# Patient Record
Sex: MALE | Race: WHITE | NOT HISPANIC OR LATINO | URBAN - METROPOLITAN AREA
[De-identification: names, ages, dates, MRNs, and addresses within clinical notes are randomized per-mention and may not be internally consistent; named-entity substitution may affect disease eponyms.]

---

## 2018-12-02 ENCOUNTER — EMERGENCY (EMERGENCY)
Facility: HOSPITAL | Age: 68
LOS: 1 days | Discharge: HOME | End: 2018-12-02
Attending: EMERGENCY MEDICINE | Admitting: EMERGENCY MEDICINE
Payer: MEDICARE

## 2018-12-02 VITALS
TEMPERATURE: 98 F | DIASTOLIC BLOOD PRESSURE: 89 MMHG | HEART RATE: 125 BPM | OXYGEN SATURATION: 100 % | SYSTOLIC BLOOD PRESSURE: 127 MMHG | RESPIRATION RATE: 20 BRPM

## 2018-12-02 VITALS — TEMPERATURE: 98 F

## 2018-12-02 DIAGNOSIS — F43.23 ADJUSTMENT DISORDER WITH MIXED ANXIETY AND DEPRESSED MOOD: ICD-10-CM

## 2018-12-02 LAB
ALBUMIN SERPL ELPH-MCNC: 4.8 G/DL — SIGNIFICANT CHANGE UP (ref 3.3–5)
ALP SERPL-CCNC: 54 U/L — SIGNIFICANT CHANGE UP (ref 40–120)
ALT FLD-CCNC: 22 U/L — SIGNIFICANT CHANGE UP (ref 4–41)
APAP SERPL-MCNC: < 15 UG/ML — LOW (ref 15–25)
AST SERPL-CCNC: 20 U/L — SIGNIFICANT CHANGE UP (ref 4–40)
BASOPHILS # BLD AUTO: 0.03 K/UL — SIGNIFICANT CHANGE UP (ref 0–0.2)
BASOPHILS NFR BLD AUTO: 0.3 % — SIGNIFICANT CHANGE UP (ref 0–2)
BILIRUB SERPL-MCNC: 2.4 MG/DL — HIGH (ref 0.2–1.2)
BUN SERPL-MCNC: 31 MG/DL — HIGH (ref 7–23)
CALCIUM SERPL-MCNC: 10 MG/DL — SIGNIFICANT CHANGE UP (ref 8.4–10.5)
CHLORIDE SERPL-SCNC: 101 MMOL/L — SIGNIFICANT CHANGE UP (ref 98–107)
CO2 SERPL-SCNC: 23 MMOL/L — SIGNIFICANT CHANGE UP (ref 22–31)
CREAT SERPL-MCNC: 1.14 MG/DL — SIGNIFICANT CHANGE UP (ref 0.5–1.3)
EOSINOPHIL # BLD AUTO: 0.04 K/UL — SIGNIFICANT CHANGE UP (ref 0–0.5)
EOSINOPHIL NFR BLD AUTO: 0.3 % — SIGNIFICANT CHANGE UP (ref 0–6)
ETHANOL BLD-MCNC: < 10 MG/DL — SIGNIFICANT CHANGE UP
GLUCOSE SERPL-MCNC: 103 MG/DL — HIGH (ref 70–99)
HCT VFR BLD CALC: 43.6 % — SIGNIFICANT CHANGE UP (ref 39–50)
HGB BLD-MCNC: 15.5 G/DL — SIGNIFICANT CHANGE UP (ref 13–17)
IMM GRANULOCYTES # BLD AUTO: 0.04 # — SIGNIFICANT CHANGE UP
IMM GRANULOCYTES NFR BLD AUTO: 0.3 % — SIGNIFICANT CHANGE UP (ref 0–1.5)
LYMPHOCYTES # BLD AUTO: 0.94 K/UL — LOW (ref 1–3.3)
LYMPHOCYTES # BLD AUTO: 7.8 % — LOW (ref 13–44)
MCHC RBC-ENTMCNC: 31.1 PG — SIGNIFICANT CHANGE UP (ref 27–34)
MCHC RBC-ENTMCNC: 35.6 % — SIGNIFICANT CHANGE UP (ref 32–36)
MCV RBC AUTO: 87.4 FL — SIGNIFICANT CHANGE UP (ref 80–100)
MONOCYTES # BLD AUTO: 0.95 K/UL — HIGH (ref 0–0.9)
MONOCYTES NFR BLD AUTO: 7.9 % — SIGNIFICANT CHANGE UP (ref 2–14)
NEUTROPHILS # BLD AUTO: 9.98 K/UL — HIGH (ref 1.8–7.4)
NEUTROPHILS NFR BLD AUTO: 83.4 % — HIGH (ref 43–77)
NRBC # FLD: 0 — SIGNIFICANT CHANGE UP
PLATELET # BLD AUTO: 213 K/UL — SIGNIFICANT CHANGE UP (ref 150–400)
PMV BLD: 9.5 FL — SIGNIFICANT CHANGE UP (ref 7–13)
POTASSIUM SERPL-MCNC: 4 MMOL/L — SIGNIFICANT CHANGE UP (ref 3.5–5.3)
POTASSIUM SERPL-SCNC: 4 MMOL/L — SIGNIFICANT CHANGE UP (ref 3.5–5.3)
PROT SERPL-MCNC: 8 G/DL — SIGNIFICANT CHANGE UP (ref 6–8.3)
RBC # BLD: 4.99 M/UL — SIGNIFICANT CHANGE UP (ref 4.2–5.8)
RBC # FLD: 12.4 % — SIGNIFICANT CHANGE UP (ref 10.3–14.5)
SALICYLATES SERPL-MCNC: < 5 MG/DL — LOW (ref 15–30)
SODIUM SERPL-SCNC: 140 MMOL/L — SIGNIFICANT CHANGE UP (ref 135–145)
TSH SERPL-MCNC: 1.15 UIU/ML — SIGNIFICANT CHANGE UP (ref 0.27–4.2)
WBC # BLD: 11.98 K/UL — HIGH (ref 3.8–10.5)
WBC # FLD AUTO: 11.98 K/UL — HIGH (ref 3.8–10.5)

## 2018-12-02 PROCEDURE — 90792 PSYCH DIAG EVAL W/MED SRVCS: CPT

## 2018-12-02 PROCEDURE — 76705 ECHO EXAM OF ABDOMEN: CPT | Mod: 26

## 2018-12-02 PROCEDURE — 99284 EMERGENCY DEPT VISIT MOD MDM: CPT | Mod: GC,25

## 2018-12-02 RX ORDER — SODIUM CHLORIDE 9 MG/ML
1000 INJECTION INTRAMUSCULAR; INTRAVENOUS; SUBCUTANEOUS ONCE
Qty: 0 | Refills: 0 | Status: COMPLETED | OUTPATIENT
Start: 2018-12-02 | End: 2018-12-02

## 2018-12-02 RX ADMIN — SODIUM CHLORIDE 2000 MILLILITER(S): 9 INJECTION INTRAMUSCULAR; INTRAVENOUS; SUBCUTANEOUS at 09:09

## 2018-12-02 NOTE — ED PROVIDER NOTE - NS ED ROS FT
GENERAL: No fever or chills, EYES: no change in vision, HEENT: no trouble swallowing or speaking, CARDIAC: no chest pain, PULMONARY: no cough or SOB, GI: no abdominal pain, no nausea, no vomiting, no diarrhea or constipation, : No changes in urination, SKIN: no rashes, NEURO: no headache,  MSK: No joint pain ~Maria Luisa De Jesus M.D. Resident

## 2018-12-02 NOTE — ED ADULT TRIAGE NOTE - CHIEF COMPLAINT QUOTE
Pt arrives c/o "not feeling well, my medication is making my mind all over the place" - presents Vibryd medication.  Prescribed by PCP for Depression.  Pt reports not taking care of himself, not eating well, not sleeping well, appearing unkempt.  Pt denies SI/HI, feels might have visual hallucinations, fixated around his brother's death 4 months ago.  Pt HR noted to be 120 in triage, denies cp/palpitation.  EKG obtained.  Awaits FIT eval.

## 2018-12-02 NOTE — ED BEHAVIORAL HEALTH ASSESSMENT NOTE - HPI (INCLUDE ILLNESS QUALITY, SEVERITY, DURATION, TIMING, CONTEXT, MODIFYING FACTORS, ASSOCIATED SIGNS AND SYMPTOMS)
69 yo CM w/ reported h/o depression which is being treated by his PCP w/ Vibryd 10mg daily. Pt states that he has been depressed since the recent passing of his brother a few month ago. Pt states that he was started Vibryd by his PCP and it been making feel like he is all over the place and not been able to concentrate well. Pt states that some time he has miss perception of objects since starting the medication. Pt states they are not psychosis like the ED doc thought, but more like Illusion. Pt states that he is very anxious they put him in the psych section. Pt states, " I don't belong here and I just wanted to know if I could stop my Vibryd? " Pt report being very upset about the ER doctor put him here in psych. Pt reports, " all said was I still grieving my brother death and he reports that he is entitle to feel like that without being locked up."   Pt denies any suicidal ideation, suicide attempt, homicidal ideation, auditory/visual hallucinations, CAH, or louise.     Pt states that he does have past alcohol issue but denies any current alcohol use or substance use.   Psychiatric ROS:  Depression: States + depressed mood, states + some change in appetite, states + decreased energy, states+  problems with concentration, states no change in interests.  Louise: States no hyperreligious, states no grandiosity, states no decreased need for sleep, states no flight of ideas, states no distractibility, states no sustained irritability or euphoria, states no hyper-talkativeness  Anxiety: States no panic attacks.     Psychotic:States no auditory and visual hallucinations.  States no thought insertion/withdrawal/broadcasting.  States no ideas of reference.  SI: States no suicidal ideation , no intent, no plan  HI: States no homicidal ideation 69 yo CM w/ reported h/o depression which is being treated by his PCP w/ Vibryd 10mg daily. Pt states that he has been depressed since the recent passing of his brother a few month ago. Pt states that he was started Vibryd by his PCP and it been making feel like he is all over the place and not been able to concentrate well. Pt states that some time he has miss perception of objects since starting the medication. Pt states they are not psychosis like the ED doc thought, but more like Illusion. Pt states that he is very anxious they put him in the psych section. Pt states, " I don't belong here and I just wanted to know if I could stop my Vibryd? " Pt report being very upset about the ER doctor put him here in psych. Pt reports, " all said was I still grieving my brother death and he reports that he is entitle to feel like that without being locked up."   Pt denies any suicidal ideation, suicide attempt, homicidal ideation, auditory/visual hallucinations, CAH, or caryl.   Per SW collateral info: Worker met with patient at bedside alongside NP Ashleigh Amaya to discuss potential discharge plans. Patient denies SI/HI but states that his brother passed away four months ago and he is still grieving his death because now he is the last surviving member of his immediate family. Patient states he feels safe at home and rents a room in [place]. Patient states he is not completely satisfied with living in the room and he is also looking for other options for housing. Worker discussed with the patient his access to food and his ADLs. Patient states he receives SSI and retired in June 2018. He states that he was supposed to spend time with his brother after skilled nursing but he passed away with cancer.  Patient states that he goes to the supermarket and he is able to shop on his own. He also states that he goes to his [employer] in the community for food drives and has insight into different resources in the community. Patient states he is able to shower on his own. Patient also states that he went to a friend’s home for thanksgiving and he did eat. Worker discussed follow up to the [facility] crisis center and encouraged patient to walk in from the hours of 9-7pm Monday to Friday. Patient states he used to go to [facility] on [address] but is reconsidering other options for care. Worker provided support around the patient’s loss.

## 2018-12-02 NOTE — ED PROVIDER NOTE - PROGRESS NOTE DETAILS
lisa: seen by psychiatry. pt to f/u crisis center tomorrow. pt alert, aware and willing to follow-up.  Heena elevated; stella torres pt to f/u.  spoke with pt and offered resources for opt food options.

## 2018-12-02 NOTE — ED BEHAVIORAL HEALTH ASSESSMENT NOTE - RISK ASSESSMENT
Low imminent risk given that he denies any active  suicidal ideation, h/o suicide attempt, homicidal ideation, auditory/visual hallucinations, CAH, or caryl. He is however chronic moderate 2/2 poor psychosocial support and access to appropriate medical care,

## 2018-12-02 NOTE — ED PROVIDER NOTE - MEDICAL DECISION MAKING DETAILS
67 yo M PMHx depression, HTN, HLD p/w depressed mood and inability to concentrate, tachycardic on exam, will check labs, EKG, tox screen, IVF, psych evaluation

## 2018-12-02 NOTE — ED ADULT NURSE NOTE - OBJECTIVE STATEMENT
pt received to room 17 pt is A&0x4 pt comes to ED for increased depression since his brother passed away. pt denies SI/HI drugs or ETOH at this time. pt has hx of OCD and ZHH in the past. pt HR was elevated in triage. pt HR is normal at this time. pt VSS 18 g placed in R AC labs were drawn and sent EDMD at bedside for eval awaiting further orders Will continue to monitor the pt

## 2018-12-02 NOTE — ED PROVIDER NOTE - PHYSICAL EXAMINATION
Gen: AAOx3, non-toxic  Head: NCAT  HEENT: EOMI, oral mucosa moist, normal conjunctiva  Lung: CTAB, no respiratory distress, no wheezes/rhonchi/rales B/L, speaking in full sentences  CV: tachycardic, no murmurs, rubs or gallops  Abd: soft, NTND, no guarding  MSK: no visible deformities  Neuro: No focal sensory or motor deficits  Skin: Warm, well perfused, no rash  Psych: normal affect.   ~Maria Luisa De Jesus M.D. Resident

## 2018-12-02 NOTE — ED ADULT NURSE NOTE - BP NONINVASIVE DIASTOLIC (MM HG)
I'm sorry. My father is quite sick and I am not adding patients. Perhaps she can see Dr. Ana Paula Reed if it cannot wait. 80

## 2018-12-02 NOTE — ED PROVIDER NOTE - OBJECTIVE STATEMENT
69 yo M PMHx depression, HTN, HLD p/w depressed mood and inability to concentrate. Pt has been taking Viibryd x 1 month for depression with minimal relief. His brother passed away about 4 months ago and he has been having a difficult time coping since then. Pt states he has no interest in activities, he doesn't want to go to work, and he doesn't want to shower or eat. He has not been sleeping well and states he is constantly thinking about a wood lamp at work that reminds him of the wood that was used in his brother's burial. He has thought of ending his life before and thought of different ways to do it but denies active suicidal or homicidal ideation. He denies fevers/chills, headaches, vision changes, CP/palpitations, SOB, cough, abd pain, N/V, dysuria, constipation/diarrhea. Denies tobacco, ETOH, drug use. 67 yo M PMHx depression, HTN, HLD p/w depressed mood and inability to concentrate. Pt has been taking Viibryd x 1 month for depression with minimal relief. His brother passed away about 4 months ago and he has been having a difficult time coping since then. Pt states he has no interest in activities, he doesn't want to go to work, and he doesn't want to shower or eat. He has not been sleeping well and states he is constantly thinking about a wood lamp at work that reminds him of the wood that was used in his brother's burial. He has thought of ending his life before and thought of different ways to do it but denies active suicidal or homicidal ideation. Pt tries to go to the [facility] center and has been getting his food from there. He denies fevers/chills, headaches, vision changes, CP/palpitations, SOB, cough, abd pain, N/V, dysuria, constipation/diarrhea. Denies tobacco, ETOH, drug use. 67 yo M PMHx depression, OCD, HTN, HLD p/w depressed mood and inability to concentrate. Pt has been taking Viibryd x 1 month for depression with minimal relief. His brother passed away about 4 months ago and he has been having a difficult time coping since then. Pt states he has no interest in activities, he doesn't want to go to work, and he doesn't want to shower or eat. He has not been sleeping well and states he is constantly thinking about a wood lamp at work that reminds him of the wood that was used in his brother's burial. He has thought of ending his life before and thought of different ways to do it but denies active suicidal or homicidal ideation. Pt tries to go to the [facility] center and has been getting his food from there. He denies fevers/chills, headaches, vision changes, CP/palpitations, SOB, cough, abd pain, N/V, dysuria, constipation/diarrhea. Denies tobacco, ETOH, drug use.

## 2018-12-02 NOTE — ED ADULT NURSE NOTE - NSIMPLEMENTINTERV_GEN_ALL_ED
Implemented All Universal Safety Interventions:  [place] to call system. Call bell, personal items and telephone within reach. Instruct patient to call for assistance. Room bathroom lighting operational. Non-slip footwear when patient is off stretcher. Physically safe environment: no spills, clutter or unnecessary equipment. Stretcher in lowest position, wheels locked, appropriate side rails in place.

## 2018-12-02 NOTE — ED PROVIDER NOTE - ATTENDING CONTRIBUTION TO CARE
lisa: pt c/o feeling depressed for months? Pt states his father  one year ago and brother  4-5 months ago. Pt has repetitive thoughts. Given an antidepressant by PCP one month ago w/o relief. Eating less, only when he goes to [employer]. lost ? 18 pounds. Drinks several glasses fluids a day. States his blood tests were normal one month ago.    pmh unremrakable.  BH: states was hospitalized several times in the past at [facility] for OCD; stopped meds some time ago (not sure when?).  SH: drank several beers a day for 30 plus years. denies recent etoh  exam: hr 103. pt appears depressed. states he has multiple thoughts of concern, including events that occurred at . Denies wanting to hurt self or others.  impression: depression with elements of OCD. tachycardia, etio??.  recc: cardiac monitor. Labs, reassess> psych consult once cleared medically.

## 2018-12-02 NOTE — ED BEHAVIORAL HEALTH ASSESSMENT NOTE - SAFETY PLAN DETAILS
Pt agree to call 911 and go to the nearest ER if sx worsen or if feels like a danger to self and others.

## 2018-12-02 NOTE — ED BEHAVIORAL HEALTH NOTE - BEHAVIORAL HEALTH NOTE
Worker met with patient at bedside alongside NP Ashleigh Amaya to discuss potential discharge plans. Patient denies SI/HI but states that his brother passed away four months ago and he is still grieving his death because now he is the last surviving member of his immediate family. Patient states he feels safe at home and rents a room in [place]. Patient states he is not completely satisfied with living in the room and he is also looking for other options for housing. Worker discussed with the patient his access to food and his ADLs. Patient states he receives SSI and retired in June 2018. He states that he was supposed to spend time with his brother after senior care but he passed away with cancer.  Patient states that he goes to the supermarket and he is able to shop on his own. He also states that he goes to his [employer] in the community for food drives and has insight into different resources in the community. Patient states he is able to shower on his own. Patient also states that he went to a friend’s home for thanksgiving and he did eat. Worker discussed follow up to the [facility] crisis center and encouraged patient to walk in from the hours of 9-7pm Monday to Friday. Patient states he used to go to [facility] on [address] but is reconsidering other options for care. Worker provided support around the patient’s loss.

## 2018-12-02 NOTE — ED BEHAVIORAL HEALTH NOTE - BEHAVIORAL HEALTH NOTE
Patent briefly seen in  ED as per request of attending. Patient reports that he is not user why he was asked to be seen psychiatrically. He denies active suicidal ideation, denies homicidal ideation, noted past pasychitrica history of inpatient hosputalzation at [facility] about 20 years due to stressors involving the relocation of his parents to [place]. There were concerns by medical ED attending who expresesd that landen has not been able to care for self but patient hansa this staing that he has been able to attend to ADLs and has access to food. Patient denies any current substance including denial of cannabis, street benzodiazepine use, LCD, PCP, cocaine, alcohol use or any other druig use but reports prior hisoory of alchol use. Patent briefly seen in  ED as per request of attending. Patient reports that he is not user why he was asked to be seen psychiatrically. He denies active suicidal ideation, denies homicidal ideation, noted past psychiatric history of inpatient hospitalization at [facility] about 20 years due to stressors involving the relocation of his parents to [place]. There were concerns by medical ED attending who expressed that patient has not been able to care for self but patient denies this stating that he has been able to attend to ADLs and has access to food. Patient denies any current substance including denial of cannabis, street benzodiazepine use, LCD, PCP, cocaine, alcohol use or any other drug use but reports prior history of alcohol use. He reports that his main concern is that housing as he is renting a one bedroom in which he noted as too small for him and he would like to receive assistance with housing he noted retiring from working as a  June 2018 and reports he experienced the loss of his brother in July 2018 who passed away due to complication of cancer. He continues to state he has been having trouble coping with his loss as he noted being the last surviving member of his immediate family. He denies any specific symptoms of depression but there is concern that patient is minimizing his depressed mood vs concern for low mood due to bereavement. He is in agreement to seek care at the crisis clinic as he is concerned with Viibryd 10mg prescribed by PCP as he no longer wishes to continue taking this medication. Patent briefly seen in  ED as per request of attending. Patient reports that he is not user why he was asked to be seen psychiatrically. He denies active suicidal ideation, denies homicidal ideation, noted past psychiatric history of inpatient hospitalization at [facility] about 20 years ago due to stressors involving the relocation of his parents to [place] at that time. There were concerns by medical ED attending who expressed that patient has not been able to care for self but patient denies this stating that he has been able to attend to ADLs and has access to food. Patient denies any current substance including denial of cannabis, street benzodiazepine use, LCD, PCP, cocaine, alcohol use or any other drug use but reports prior history of alcohol use. He reports that his main concern is that housing as he is renting a one bedroom in which he noted as too small for him and he would like to receive assistance with housing he noted retiring from working as a  June 2018 and reports he experienced the loss of his brother in July 2018 who passed away due to complication of cancer. He continues to state he has been having trouble coping with his loss as he noted being the last surviving member of his immediate family. He denies any specific symptoms of depression but there is concern that patient is minimizing his depressed mood vs concern for low mood due to bereavement. He is in agreement to seek care at the crisis clinic as he is concerned with Viibryd 10mg prescribed by PCP as he no longer wishes to continue taking this medication. Patent briefly seen in  ED as per request of attending. Patient reports that he is not user why he was asked to be seen psychiatrically. He denies active suicidal ideation, denies homicidal ideation, noted past psychiatric history of inpatient hospitalization at [facility] about 20 years ago due to stressors involving the relocation of his parents to [place] at that time. There were concerns by medical ED attending who expressed that patient has not been able to care for self but patient denies this stating that he has been able to attend to ADLs and has access to food. Patient denies any current substance including denial of cannabis, street benzodiazepine use, LCD, PCP, cocaine, alcohol use or any other drug use but reports prior history of alcohol use (last alcohol use Jan 2018 of 2-3 beers) with denies of past rehab visits, seizures, complicated withdrawal symptoms, black out or DTs. He reports that his main concern is that housing as he is renting a one bedroom in which he noted as too small for him and he would like to receive assistance with housing he noted retiring from working as a  June 2018 and reports he experienced the loss of his brother in July 2018 who passed away due to complication of cancer. He continues to state he has been having trouble coping with his loss as he noted being the last surviving member of his immediate family. He denies any specific symptoms of depression but there is concern that patient is minimizing his depressed mood vs concern for low mood due to bereavement. He is in agreement to seek care at the crisis clinic as he is concerned with Viibryd 10mg prescribed by PCP as he no longer wishes to continue taking this medication. Patent briefly seen in  ED as per request of attending. Patient reports that he is not user why he was asked to be seen psychiatrically. He denies active suicidal ideation, denies homicidal ideation, noted past psychiatric history of inpatient hospitalization at [facility] about 20 years ago due to stressors involving the relocation of his parents to [place] at that time. There were concerns by medical ED attending who expressed that patient has not been able to care for self but patient denies this stating that he has been able to attend to ADLs and has access to food. Patient denies any current substance including denial of cannabis, street benzodiazepine use, LCD, PCP, cocaine, alcohol use or any other drug use but reports prior history of alcohol use (last alcohol use Jan 2018 of 2-3 beers). He denies of past rehab visits, seizures, complicated withdrawal symptoms, black out or DTs related to alcohol. He reports that his main concern is that housing as he is renting a one bedroom in which he noted as too small for him and he would like to receive assistance with housing he noted retiring from working as a  June 2018 and reports he experienced the loss of his brother in July 2018 who passed away due to complication of cancer. He continues to state he has been having trouble coping with his loss as he noted being the last surviving member of his immediate family. He denies any specific symptoms of depression but there is concern that patient is minimizing his depressed mood vs concern for low mood due to bereavement. He is in agreement to seek care at the crisis clinic as he is concerned with Viibryd 10mg prescribed by PCP as he no longer wishes to continue taking this medication.

## 2018-12-02 NOTE — ED BEHAVIORAL HEALTH ASSESSMENT NOTE - DESCRIPTION
Calm and cooperative   Vital Signs Last 24 Hrs  T(C): 36.4 (02 Dec 2018 09:25), Max: 36.7 (02 Dec 2018 09:09)  T(F): 97.5 (02 Dec 2018 09:25), Max: 98 (02 Dec 2018 09:09)  HR: 97 (02 Dec 2018 09:16) (97 - 125)  BP: 148/74 (02 Dec 2018 09:16) (127/89 - 148/74)  BP(mean): --  RR: 14 (02 Dec 2018 09:16) (14 - 20)  SpO2: 100% (02 Dec 2018 09:16) (100% - 100%) See ED provider note. Pt w/ increase white count. Pt was med cleared See HPI and BH notes

## 2018-12-02 NOTE — ED BEHAVIORAL HEALTH ASSESSMENT NOTE - SUMMARY
67 yo CM w/ reported h/o depression which is being treated by his PCP w/ Vibryd 10mg daily. Pt states that he has been depressed since the recent passing of his brother a few month ago. Pt states that he was started Vibryd by his PCP and it been making feel like he is all over the place and not been able to concentrate well. Pt states that some time he has miss perception of objects since starting the medication. Pt states they are not psychosis like the ED doc thought, but more like Illusion. Pt states that he is very anxious they put him in the psych section. Pt states, " I don't belong here and I just wanted to know if I could stop my Vibryd? " Pt report being very upset about the ER doctor put him here in psych. Pt reports, " all said was I still grieving my brother death and he reports that he is entitle to feel like that without being locked up." Pt denies any active suicidal ideation, h/o suicide attempt, homicidal ideation, auditory/visual hallucinations, CAH, or caryl.   Pt educated that he is on the initial dose of Vibryd and he can d/c if it causing serious side effects but he should inform his PCP before doing so. Pt also educated that a medication like Vibryd should be manage by a psychiatrist instead a PCP because the medication has risk of worsening sx and it not just a simple SSRI which most PCP can prescribe. Pt offered [facility] which he accept and agrees to f/u tomorrow with. Pt also had some housing issues which were address by SW.  Pt offered [facility] psych admission which he decline and current doesn't meet the criteria for [facility] admission.

## 2018-12-03 ENCOUNTER — OUTPATIENT (OUTPATIENT)
Dept: OUTPATIENT SERVICES | Facility: HOSPITAL | Age: 68
LOS: 1 days | Discharge: STILL ADMITTED OR ADMINISTRATIVE | End: 2018-12-03

## 2018-12-03 NOTE — PROVIDER CONTACT NOTE (OTHER) - ASSESSMENT
jarrell received call from patient stating that his address is [address] [place] [address].  If patient is not at that residence he can be found at [address] [place] [address]

## 2018-12-04 ENCOUNTER — EMERGENCY (EMERGENCY)
Facility: HOSPITAL | Age: 68
LOS: 1 days | Discharge: HOME | End: 2018-12-04
Attending: EMERGENCY MEDICINE | Admitting: EMERGENCY MEDICINE
Payer: MEDICARE

## 2018-12-04 VITALS
HEART RATE: 97 BPM | SYSTOLIC BLOOD PRESSURE: 152 MMHG | RESPIRATION RATE: 18 BRPM | OXYGEN SATURATION: 99 % | TEMPERATURE: 98 F | DIASTOLIC BLOOD PRESSURE: 92 MMHG

## 2018-12-04 VITALS
SYSTOLIC BLOOD PRESSURE: 153 MMHG | RESPIRATION RATE: 16 BRPM | HEART RATE: 84 BPM | DIASTOLIC BLOOD PRESSURE: 87 MMHG | TEMPERATURE: 98 F | OXYGEN SATURATION: 100 %

## 2018-12-04 DIAGNOSIS — N40.0 BENIGN PROSTATIC HYPERPLASIA WITHOUT LOWER URINARY TRACT SYMPTOMS: ICD-10-CM

## 2018-12-04 DIAGNOSIS — E78.5 HYPERLIPIDEMIA, UNSPECIFIED: ICD-10-CM

## 2018-12-04 DIAGNOSIS — F33.9 MAJOR DEPRESSIVE DISORDER, RECURRENT, UNSPECIFIED: ICD-10-CM

## 2018-12-04 DIAGNOSIS — F41.9 ANXIETY DISORDER, UNSPECIFIED: ICD-10-CM

## 2018-12-04 DIAGNOSIS — I10 ESSENTIAL (PRIMARY) HYPERTENSION: ICD-10-CM

## 2018-12-04 LAB
ALBUMIN SERPL ELPH-MCNC: 4.5 G/DL — SIGNIFICANT CHANGE UP (ref 3.3–5)
ALP SERPL-CCNC: 56 U/L — SIGNIFICANT CHANGE UP (ref 40–120)
ALT FLD-CCNC: 19 U/L — SIGNIFICANT CHANGE UP (ref 4–41)
APAP SERPL-MCNC: < 15 UG/ML — LOW (ref 15–25)
AST SERPL-CCNC: 25 U/L — SIGNIFICANT CHANGE UP (ref 4–40)
BASOPHILS # BLD AUTO: 0.05 K/UL — SIGNIFICANT CHANGE UP (ref 0–0.2)
BASOPHILS NFR BLD AUTO: 0.4 % — SIGNIFICANT CHANGE UP (ref 0–2)
BILIRUB SERPL-MCNC: 1.7 MG/DL — HIGH (ref 0.2–1.2)
BUN SERPL-MCNC: 26 MG/DL — HIGH (ref 7–23)
CALCIUM SERPL-MCNC: 9.7 MG/DL — SIGNIFICANT CHANGE UP (ref 8.4–10.5)
CHLORIDE SERPL-SCNC: 103 MMOL/L — SIGNIFICANT CHANGE UP (ref 98–107)
CK SERPL-CCNC: 258 U/L — HIGH (ref 30–200)
CO2 SERPL-SCNC: 24 MMOL/L — SIGNIFICANT CHANGE UP (ref 22–31)
CREAT SERPL-MCNC: 1.01 MG/DL — SIGNIFICANT CHANGE UP (ref 0.5–1.3)
EOSINOPHIL # BLD AUTO: 0.13 K/UL — SIGNIFICANT CHANGE UP (ref 0–0.5)
EOSINOPHIL NFR BLD AUTO: 0.9 % — SIGNIFICANT CHANGE UP (ref 0–6)
ETHANOL BLD-MCNC: < 10 MG/DL — SIGNIFICANT CHANGE UP
GLUCOSE SERPL-MCNC: 104 MG/DL — HIGH (ref 70–99)
HCT VFR BLD CALC: 43.2 % — SIGNIFICANT CHANGE UP (ref 39–50)
HGB BLD-MCNC: 14.8 G/DL — SIGNIFICANT CHANGE UP (ref 13–17)
IMM GRANULOCYTES # BLD AUTO: 0.07 # — SIGNIFICANT CHANGE UP
IMM GRANULOCYTES NFR BLD AUTO: 0.5 % — SIGNIFICANT CHANGE UP (ref 0–1.5)
LYMPHOCYTES # BLD AUTO: 1.67 K/UL — SIGNIFICANT CHANGE UP (ref 1–3.3)
LYMPHOCYTES # BLD AUTO: 11.9 % — LOW (ref 13–44)
MAGNESIUM SERPL-MCNC: 2.1 MG/DL — SIGNIFICANT CHANGE UP (ref 1.6–2.6)
MCHC RBC-ENTMCNC: 29.8 PG — SIGNIFICANT CHANGE UP (ref 27–34)
MCHC RBC-ENTMCNC: 34.3 % — SIGNIFICANT CHANGE UP (ref 32–36)
MCV RBC AUTO: 87.1 FL — SIGNIFICANT CHANGE UP (ref 80–100)
MONOCYTES # BLD AUTO: 1.11 K/UL — HIGH (ref 0–0.9)
MONOCYTES NFR BLD AUTO: 7.9 % — SIGNIFICANT CHANGE UP (ref 2–14)
NEUTROPHILS # BLD AUTO: 11.03 K/UL — HIGH (ref 1.8–7.4)
NEUTROPHILS NFR BLD AUTO: 78.4 % — HIGH (ref 43–77)
NRBC # FLD: 0 — SIGNIFICANT CHANGE UP
PHOSPHATE SERPL-MCNC: 3.2 MG/DL — SIGNIFICANT CHANGE UP (ref 2.5–4.5)
PLATELET # BLD AUTO: 218 K/UL — SIGNIFICANT CHANGE UP (ref 150–400)
PMV BLD: 9.4 FL — SIGNIFICANT CHANGE UP (ref 7–13)
POTASSIUM SERPL-MCNC: 3.6 MMOL/L — SIGNIFICANT CHANGE UP (ref 3.5–5.3)
POTASSIUM SERPL-SCNC: 3.6 MMOL/L — SIGNIFICANT CHANGE UP (ref 3.5–5.3)
PROT SERPL-MCNC: 7.8 G/DL — SIGNIFICANT CHANGE UP (ref 6–8.3)
RBC # BLD: 4.96 M/UL — SIGNIFICANT CHANGE UP (ref 4.2–5.8)
RBC # FLD: 12.4 % — SIGNIFICANT CHANGE UP (ref 10.3–14.5)
SALICYLATES SERPL-MCNC: < 5 MG/DL — LOW (ref 15–30)
SODIUM SERPL-SCNC: 142 MMOL/L — SIGNIFICANT CHANGE UP (ref 135–145)
WBC # BLD: 14.06 K/UL — HIGH (ref 3.8–10.5)
WBC # FLD AUTO: 14.06 K/UL — HIGH (ref 3.8–10.5)

## 2018-12-04 PROCEDURE — 71046 X-RAY EXAM CHEST 2 VIEWS: CPT | Mod: 26

## 2018-12-04 PROCEDURE — 90792 PSYCH DIAG EVAL W/MED SRVCS: CPT

## 2018-12-04 PROCEDURE — 99284 EMERGENCY DEPT VISIT MOD MDM: CPT | Mod: GC

## 2018-12-04 RX ORDER — SODIUM CHLORIDE 9 MG/ML
1000 INJECTION INTRAMUSCULAR; INTRAVENOUS; SUBCUTANEOUS ONCE
Qty: 0 | Refills: 0 | Status: COMPLETED | OUTPATIENT
Start: 2018-12-04 | End: 2018-12-04

## 2018-12-04 RX ADMIN — Medication 1 MILLIGRAM(S): at 22:58

## 2018-12-04 RX ADMIN — SODIUM CHLORIDE 1000 MILLILITER(S): 9 INJECTION INTRAMUSCULAR; INTRAVENOUS; SUBCUTANEOUS at 22:34

## 2018-12-04 NOTE — ED BEHAVIORAL HEALTH ASSESSMENT NOTE - DESCRIPTION
Calm and cooperative See ED provider note. Pt w/ increase white count. Pt was med cleared See HPI and BH notes HTN, HLD, BPH Patient retired

## 2018-12-04 NOTE — ED PROVIDER NOTE - ATTENDING CONTRIBUTION TO CARE
Riyabetzaida: 67 yo male with a h/o depression sent in by [facility] for admission?. Pt endorses that he saw them yesterday and they recommended that he come in. Pt denies suicidal/homicidal ideations. No audio/visual hallucinations. Pt has recently stopped taking his meds and has not been eating or drinking. Exam: GENERAL: well appearing, NAD, HEENT: MMM, PERRLA, CARDIO: +S1/S2, no murmurs, rubs or gallops, LUNGS: CTA B/L, no wheezing, rales or rhonchi, ABD: soft, nontender, BSx4 quadrants, no guarding or rigidity. EXT: b/l upper extremity tremors NEURO: AxOx3, SKIN: no rashes or lesions, well perfused A/P- 67 yo male with psychiatric history sent in for possible admission. will obtain med clearance labs and consult psych.

## 2018-12-04 NOTE — ED PROVIDER NOTE - PHYSICAL EXAMINATION
GENERAL: No acute distress, poor hygeine  HEAD:  Atraumatic, Normocephalic  ENT: EOMI, PERRLA,   CHEST/LUNG: Clear to auscultation bilaterally; No wheeze, equal breath sounds bilaterally   HEART: Mild tachycardia, regular rhythm; No murmurs, rubs, or gallops  ABDOMEN: Soft, Nontender, Nondistended; Bowel sounds present, no organomegaly  EXTREMITIES:  2+ Peripheral Pulses, No clubbing, cyanosis, or edema  PSYCH: AAOx3, tangential speech, disorganized speech, no paranoia or delusional thoughts apparent  NEUROLOGY: +bilateral hand tremor  SKIN: Normal color, No rashes or lesions

## 2018-12-04 NOTE — ED PROVIDER NOTE - PLAN OF CARE
Please follow up with Dr. Vera at the [facility] on December 11 for your scheduled appointment.

## 2018-12-04 NOTE — ED ADULT TRIAGE NOTE - CHIEF COMPLAINT QUOTE
Pt states that he was referred to the ED by Dr. Brittany Vera from the [facility] for "medical clearance". Pt states "and I am unable to care for myself. I live alone, I have to shop and cook for myself and I can't because I have a lot of stress because I buried my brother 4 months ago". Pt denies si, hi, ah/vh or etoh/substance use.

## 2018-12-04 NOTE — ED PROVIDER NOTE - OBJECTIVE STATEMENT
67 yo M hx HTN, depression, here for "medical clearance". He was seen in our ED two days ago for depression, at that time denied active suicidal ideation. At that time there was concern he was not able to care for self. Psych was consulted and advised outpatient follow up. He has appointment to see Dr. Vera at [facility] crisis center on December 11. He states he took Vybrid for one month, stopped last night, took 5 mg lexapro last night which is new medication for him, and tonight did not take anything. He says "I need to be medically cleared and have my medications adjusted but I do not want to be locked up and want to request to be sent only to day hospital".  Patient denies any current substance including denial of cannabis, street benzodiazepine use, LCD, PCP, cocaine, alcohol use or any other drugs. He is renting a room in a house and denies homelessness. He states he is having trouble going out, showering, working since his brother's death several months ago.     Patient denies physical complaints at this time.

## 2018-12-04 NOTE — ED PROVIDER NOTE - NSFOLLOWUPINSTRUCTIONS_ED_ALL_ED_FT
You were seen in the emergency department for depression.    You were evaluated by the inpatient psychiatry team here at [facility]. You did not wish to be admitted voluntarily, and did not meet criteria for involuntary inpatient admission.     Please continue to take all medications as prescribed, including those prescribed to you by your psychiatrist Dr. Brittany Vera.    Please keep your appointment with Dr. Vera for next week on December 11.    If you experience thoughts of suicide or hurting yourself or others please return to the Emergency department immediately.

## 2018-12-04 NOTE — ED PROVIDER NOTE - MEDICAL DECISION MAKING DETAILS
Will speak with psychiatry attending on call to clarify reason for admission.  Check CBC, CMP, EKG in anticipation of admission to psych.

## 2018-12-04 NOTE — ED BEHAVIORAL HEALTH ASSESSMENT NOTE - RISK ASSESSMENT
Low imminent risk given that he denies any active suicidal ideation, h/o suicide attempt, homicidal ideation, auditory/visual hallucinations, CAH, or caryl. He is however chronic moderate 2/2 poor psychosocial support and access to appropriate medical care, Low imminent risk given that he denies any active suicidal ideation, h/o suicide attempt, homicidal ideation, auditory/visual hallucinations, CAH, or caryl. He is help seeking, attends [employer], and does not have access to firearms. He is however chronic moderate 2/2 poor psychosocial support and access to appropriate medical care,

## 2018-12-04 NOTE — ED BEHAVIORAL HEALTH ASSESSMENT NOTE - HPI (INCLUDE ILLNESS QUALITY, SEVERITY, DURATION, TIMING, CONTEXT, MODIFYING FACTORS, ASSOCIATED SIGNS AND SYMPTOMS)
67 yo CM w/ reported h/o depression which is being treated by his PCP w/ Vibryd 10mg daily. Pt states that he has been depressed since the recent passing of his brother a few month ago. Pt states that he was started Vibryd by his PCP and it been making feel like he is all over the place and not been able to concentrate well. Pt states that some time he has miss perception of objects since starting the medication. Pt states they are not psychosis like the ED doc thought, but more like Illusion. Pt states that he is very anxious they put him in the psych section. Pt states, " I don't belong here and I just wanted to know if I could stop my Vibryd? " Pt report being very upset about the ER doctor put him here in psych. Pt reports, " all said was I still grieving my brother death and he reports that he is entitle to feel like that without being locked up."   Pt denies any suicidal ideation, suicide attempt, homicidal ideation, auditory/visual hallucinations, CAH, or caryl.   Per SW collateral info: Worker met with patient at bedside alongside NP Ashleigh Amaya to discuss potential discharge plans. Patient denies SI/HI but states that his brother passed away four months ago and he is still grieving his death because now he is the last surviving member of his immediate family. Patient states he feels safe at home and rents a room in [place]. Patient states he is not completely satisfied with living in the room and he is also looking for other options for housing. Worker discussed with the patient his access to food and his ADLs. Patient states he receives SSI and retired in June 2018. He states that he was supposed to spend time with his brother after penitentiary but he passed away with cancer.  Patient states that he goes to the supermarket and he is able to shop on his own. He also states that he goes to his [employer] in the community for food drives and has insight into different resources in the community. Patient states he is able to shower on his own. Patient also states that he went to a friend’s home for thanksgiving and he did eat. Worker discussed follow up to the [facility] crisis center and encouraged patient to walk in from the hours of 9-7pm Monday to Friday. Patient states he used to go to [facility] on [address] but is reconsidering other options for care. Worker provided support around the patient’s loss. 69 yo single  male, no dependents, SSI, retired , domiciled alone, with history of depression and anxiety, 1 prior inpatient admission in 1997, no history of suicide attempts or NSSIB, no drug abuse or legal issues, presented to the [facility] ED 12/2/18 and referred to the crisis clinic. He presented to the crisis clinic on 12/3/18 where he was started on Lexapro 5mg with follow-up in 1 week. He is presenting to the emergency room tonight stating Dr. Vera informed him that he needed to get medical clearance.     Patient states he presented to the crisis clinic yesterday and met with Dr. Vera and the . He was started on Lexapro 5mg daily yesterday. He states that he took the medication last night however did not take it today. He reports today he felt that he started to potentially experience side effects of the Lexapro. He was not able to articulate what side effects he felt he may have experienced only that he felt they were "coming on". He came to the emergency room because he states Dr. Vera told him that if he does not feel well he should come in for medical clearance. He feels he needs his medication adjusted again. He states he is not suicidal and does not need or want to be admitted to a psychiatric hospital.  He asks MD to ask Dr. Vera if he could be referred to a day treatment program. He states he was admitted to a day treatment program int he past and found it to be very helpful. MD suggested that patient follow-up at Crisis Clinic sooner, potentially tomorrow to discuss his concerns regarding Lexapro and day treatment. The patient reports the crisis clinic is far from his home but he will try.

## 2018-12-04 NOTE — ED ADULT NURSE NOTE - CHIEF COMPLAINT QUOTE
Pt states that he was referred to the ED by Dr. Brittany Vera from the [facility] for "medical clearance". Pt states "and I am unable to care for myself. I live alone, I have to shop and cook for myself and I can't because I have a lot of stress because I buried my brother 4 months ago". Pt denies si, hi, ah/vh or etoh/substance use.

## 2018-12-04 NOTE — ED ADULT NURSE NOTE - OBJECTIVE STATEMENT
Patient received AA&Ox3 sent to ED by MD Jody from [facility] for medical clearance r/t pt. stating that he is weak and unable to take care of himself. Pt. was seen 2 days ago for similar complaints. VSS on RA. Patient denies chest pain, N/V, SOB, fever, chills, dizziness, abdominal pain, HI/SI at this time.

## 2018-12-04 NOTE — ED BEHAVIORAL HEALTH ASSESSMENT NOTE - SUMMARY
Pt offered VOL psych admission which he decline and current doesn't meet the criteria for INVOL admission. 67 yo single  male, no dependents, SSI, retired , domiciled alone, with history of depression and anxiety, 1 prior inpatient admission in 1997, no history of suicide attempts or NSSIB, no drug abuse or legal issues, presented to the [facility] ED 12/2/18 and referred to the crisis clinic. He presented to the crisis clinic on 12/3/18 where he was started on Lexapro 5mg with follow-up in 1 week. He is presenting to the emergency room tonight stating Dr. Vera informed him that he needed to get medical clearance. Patient self discontinued Lexapro after 1 dose for fear of potential side effects. He is requesting medication adjustment as well as referral to a day treatment program. Pt denies any active suicidal ideation, h/o suicide attempt, homicidal ideation, auditory/visual hallucinations, CAH, or caryl.     Pt offered voluntary psych admission which he decline and current doesn't meet the criteria for involuntary admission.

## 2018-12-04 NOTE — ED PROVIDER NOTE - CARE PLAN
Assessment and plan of treatment:	Please follow up with Dr. Vera at the [facility] on December 11 for your scheduled appointment. Principal Discharge DX:	Depression, unspecified depression type  Assessment and plan of treatment:	Please follow up with Dr. Vera at the [facility] on December 11 for your scheduled appointment.

## 2018-12-05 LAB
AMPHET UR-MCNC: NEGATIVE — SIGNIFICANT CHANGE UP
APPEARANCE UR: CLEAR — SIGNIFICANT CHANGE UP
BACTERIA # UR AUTO: NEGATIVE — SIGNIFICANT CHANGE UP
BARBITURATES UR SCN-MCNC: NEGATIVE — SIGNIFICANT CHANGE UP
BENZODIAZ UR-MCNC: NEGATIVE — SIGNIFICANT CHANGE UP
BILIRUB UR-MCNC: NEGATIVE — SIGNIFICANT CHANGE UP
BLOOD UR QL VISUAL: HIGH
CANNABINOIDS UR-MCNC: NEGATIVE — SIGNIFICANT CHANGE UP
COCAINE METAB.OTHER UR-MCNC: NEGATIVE — SIGNIFICANT CHANGE UP
COLOR SPEC: SIGNIFICANT CHANGE UP
GLUCOSE UR-MCNC: NEGATIVE — SIGNIFICANT CHANGE UP
HYALINE CASTS # UR AUTO: NEGATIVE — SIGNIFICANT CHANGE UP
KETONES UR-MCNC: NEGATIVE — SIGNIFICANT CHANGE UP
LEUKOCYTE ESTERASE UR-ACNC: NEGATIVE — SIGNIFICANT CHANGE UP
METHADONE UR-MCNC: NEGATIVE — SIGNIFICANT CHANGE UP
NITRITE UR-MCNC: NEGATIVE — SIGNIFICANT CHANGE UP
OPIATES UR-MCNC: NEGATIVE — SIGNIFICANT CHANGE UP
OXYCODONE UR-MCNC: NEGATIVE — SIGNIFICANT CHANGE UP
PCP UR-MCNC: NEGATIVE — SIGNIFICANT CHANGE UP
PH UR: 7 — SIGNIFICANT CHANGE UP (ref 5–8)
PROT UR-MCNC: 10 — SIGNIFICANT CHANGE UP
RBC CASTS # UR COMP ASSIST: >50 — HIGH (ref 0–?)
SP GR SPEC: 1.02 — SIGNIFICANT CHANGE UP (ref 1–1.04)
SQUAMOUS # UR AUTO: SIGNIFICANT CHANGE UP
UROBILINOGEN FLD QL: NORMAL — SIGNIFICANT CHANGE UP
WBC UR QL: SIGNIFICANT CHANGE UP (ref 0–?)

## 2018-12-11 ENCOUNTER — INPATIENT (INPATIENT)
Facility: HOSPITAL | Age: 68
LOS: 12 days | Discharge: HOME | End: 2018-12-24
Attending: PSYCHIATRY & NEUROLOGY | Admitting: PSYCHIATRY & NEUROLOGY
Payer: MEDICARE

## 2018-12-11 VITALS
SYSTOLIC BLOOD PRESSURE: 161 MMHG | TEMPERATURE: 98 F | HEART RATE: 108 BPM | DIASTOLIC BLOOD PRESSURE: 88 MMHG | RESPIRATION RATE: 17 BRPM | OXYGEN SATURATION: 98 %

## 2018-12-11 PROBLEM — I10 ESSENTIAL (PRIMARY) HYPERTENSION: Chronic | Status: ACTIVE | Noted: 2018-12-04

## 2018-12-11 LAB
ALBUMIN SERPL ELPH-MCNC: 4.7 G/DL — SIGNIFICANT CHANGE UP (ref 3.3–5)
ALP SERPL-CCNC: 57 U/L — SIGNIFICANT CHANGE UP (ref 40–120)
ALT FLD-CCNC: 20 U/L — SIGNIFICANT CHANGE UP (ref 4–41)
AMPHET UR-MCNC: NEGATIVE — SIGNIFICANT CHANGE UP
APAP SERPL-MCNC: < 15 UG/ML — LOW (ref 15–25)
APPEARANCE UR: CLEAR — SIGNIFICANT CHANGE UP
AST SERPL-CCNC: 21 U/L — SIGNIFICANT CHANGE UP (ref 4–40)
BACTERIA # UR AUTO: NEGATIVE — SIGNIFICANT CHANGE UP
BARBITURATES UR SCN-MCNC: NEGATIVE — SIGNIFICANT CHANGE UP
BASOPHILS # BLD AUTO: 0.04 K/UL — SIGNIFICANT CHANGE UP (ref 0–0.2)
BASOPHILS NFR BLD AUTO: 0.3 % — SIGNIFICANT CHANGE UP (ref 0–2)
BENZODIAZ UR-MCNC: NEGATIVE — SIGNIFICANT CHANGE UP
BILIRUB SERPL-MCNC: 1.9 MG/DL — HIGH (ref 0.2–1.2)
BILIRUB UR-MCNC: NEGATIVE — SIGNIFICANT CHANGE UP
BLOOD UR QL VISUAL: HIGH
BUN SERPL-MCNC: 30 MG/DL — HIGH (ref 7–23)
CALCIUM SERPL-MCNC: 9.3 MG/DL — SIGNIFICANT CHANGE UP (ref 8.4–10.5)
CANNABINOIDS UR-MCNC: NEGATIVE — SIGNIFICANT CHANGE UP
CHLORIDE SERPL-SCNC: 103 MMOL/L — SIGNIFICANT CHANGE UP (ref 98–107)
CO2 SERPL-SCNC: 25 MMOL/L — SIGNIFICANT CHANGE UP (ref 22–31)
COCAINE METAB.OTHER UR-MCNC: NEGATIVE — SIGNIFICANT CHANGE UP
COLOR SPEC: YELLOW — SIGNIFICANT CHANGE UP
CREAT SERPL-MCNC: 1.1 MG/DL — SIGNIFICANT CHANGE UP (ref 0.5–1.3)
EOSINOPHIL # BLD AUTO: 0.13 K/UL — SIGNIFICANT CHANGE UP (ref 0–0.5)
EOSINOPHIL NFR BLD AUTO: 0.9 % — SIGNIFICANT CHANGE UP (ref 0–6)
ETHANOL BLD-MCNC: < 10 MG/DL — SIGNIFICANT CHANGE UP
GLUCOSE SERPL-MCNC: 86 MG/DL — SIGNIFICANT CHANGE UP (ref 70–99)
GLUCOSE UR-MCNC: NEGATIVE — SIGNIFICANT CHANGE UP
HCT VFR BLD CALC: 42.7 % — SIGNIFICANT CHANGE UP (ref 39–50)
HGB BLD-MCNC: 14.6 G/DL — SIGNIFICANT CHANGE UP (ref 13–17)
HYALINE CASTS # UR AUTO: HIGH
IMM GRANULOCYTES # BLD AUTO: 0.08 # — SIGNIFICANT CHANGE UP
IMM GRANULOCYTES NFR BLD AUTO: 0.6 % — SIGNIFICANT CHANGE UP (ref 0–1.5)
KETONES UR-MCNC: SIGNIFICANT CHANGE UP
LEUKOCYTE ESTERASE UR-ACNC: NEGATIVE — SIGNIFICANT CHANGE UP
LYMPHOCYTES # BLD AUTO: 1.96 K/UL — SIGNIFICANT CHANGE UP (ref 1–3.3)
LYMPHOCYTES # BLD AUTO: 13.9 % — SIGNIFICANT CHANGE UP (ref 13–44)
MCHC RBC-ENTMCNC: 30 PG — SIGNIFICANT CHANGE UP (ref 27–34)
MCHC RBC-ENTMCNC: 34.2 % — SIGNIFICANT CHANGE UP (ref 32–36)
MCV RBC AUTO: 87.9 FL — SIGNIFICANT CHANGE UP (ref 80–100)
METHADONE UR-MCNC: NEGATIVE — SIGNIFICANT CHANGE UP
MONOCYTES # BLD AUTO: 1.35 K/UL — HIGH (ref 0–0.9)
MONOCYTES NFR BLD AUTO: 9.5 % — SIGNIFICANT CHANGE UP (ref 2–14)
NEUTROPHILS # BLD AUTO: 10.59 K/UL — HIGH (ref 1.8–7.4)
NEUTROPHILS NFR BLD AUTO: 74.8 % — SIGNIFICANT CHANGE UP (ref 43–77)
NITRITE UR-MCNC: NEGATIVE — SIGNIFICANT CHANGE UP
NRBC # FLD: 0 — SIGNIFICANT CHANGE UP
OPIATES UR-MCNC: NEGATIVE — SIGNIFICANT CHANGE UP
OXYCODONE UR-MCNC: NEGATIVE — SIGNIFICANT CHANGE UP
PCP UR-MCNC: NEGATIVE — SIGNIFICANT CHANGE UP
PH UR: 5.5 — SIGNIFICANT CHANGE UP (ref 5–8)
PLATELET # BLD AUTO: 219 K/UL — SIGNIFICANT CHANGE UP (ref 150–400)
PMV BLD: 9.8 FL — SIGNIFICANT CHANGE UP (ref 7–13)
POTASSIUM SERPL-MCNC: 3.8 MMOL/L — SIGNIFICANT CHANGE UP (ref 3.5–5.3)
POTASSIUM SERPL-SCNC: 3.8 MMOL/L — SIGNIFICANT CHANGE UP (ref 3.5–5.3)
PROT SERPL-MCNC: 7.8 G/DL — SIGNIFICANT CHANGE UP (ref 6–8.3)
PROT UR-MCNC: 50 — SIGNIFICANT CHANGE UP
RBC # BLD: 4.86 M/UL — SIGNIFICANT CHANGE UP (ref 4.2–5.8)
RBC # FLD: 12.7 % — SIGNIFICANT CHANGE UP (ref 10.3–14.5)
RBC CASTS # UR COMP ASSIST: SIGNIFICANT CHANGE UP (ref 0–?)
SALICYLATES SERPL-MCNC: < 5 MG/DL — LOW (ref 15–30)
SODIUM SERPL-SCNC: 143 MMOL/L — SIGNIFICANT CHANGE UP (ref 135–145)
SP GR SPEC: 1.03 — SIGNIFICANT CHANGE UP (ref 1–1.04)
SQUAMOUS # UR AUTO: SIGNIFICANT CHANGE UP
TSH SERPL-MCNC: 1.07 UIU/ML — SIGNIFICANT CHANGE UP (ref 0.27–4.2)
UROBILINOGEN FLD QL: NORMAL — SIGNIFICANT CHANGE UP
WBC # BLD: 14.15 K/UL — HIGH (ref 3.8–10.5)
WBC # FLD AUTO: 14.15 K/UL — HIGH (ref 3.8–10.5)
WBC UR QL: SIGNIFICANT CHANGE UP (ref 0–?)

## 2018-12-11 NOTE — ED PROVIDER NOTE - MUSCULOSKELETAL, MLM
Spine appears normal, range of motion is not limited, no muscle or joint tenderness. back nontender.  No CVAT b/l

## 2018-12-11 NOTE — ED ADULT TRIAGE NOTE - NS ED TRIAGE AVPU SCALE
Discharge Medication Note:    Mr. Richards is getting discharged today after admission for HM II implant on 3/8/18. His PMH is significant for CHF, CKD3, VT s/p ICD placement (on amiodarone), gout, HTN. Of note, the patient is on PREVENT II trial - Do NOT order aspirin. Please call Yenny [phone] if patient is admitted to hospital.    Medication changes:   A) Added amlodipine 5mg daily for BP control   B) Decreased allopurinol to 100mg daily (from TID)   C) Ferrous gluconate 324mg daily initiated   D) Discharged on lasix 40mg BID (previous bumex 2mg BID discontinued)    Warfarin management: INR goal 2-3, bridging required. He is on INV aspirin/placebo tablet per PREVENT II trial  Discharged on 5mg TTS, 7.5mg all other days    Follow-up  INR check Thursday  BP    Met with Tim Yonis Richards at discharge to review discharge medications and to update the blue medication card.       Tim Richards   Home Medication Instructions TRE:[id number]    Printed on:03/26/18 1710   Medication Information                      allopurinol (ZYLOPRIM) 100 MG tablet  Take 1 tablet (100 mg total) by mouth once daily.             amiodarone (PACERONE) 200 MG Tab  TAKE 1 TABLET (200 MG TOTAL) BY MOUTH ONCE DAILY.             amLODIPine (NORVASC) 5 MG tablet  Take 1 tablet (5 mg total) by mouth once daily.             docusate sodium (COLACE) 100 MG capsule  Take 2 capsules (200 mg total) by mouth every evening.             ferrous gluconate (FERGON) 324 MG tablet  Take 1 tablet (324 mg total) by mouth daily with breakfast.             furosemide (LASIX) 40 MG tablet  Take 1 tablet (40 mg total) by mouth 2 (two) times daily.             INV aspirin/placebo tablet  Take 1 tablet (81 mg total) by mouth once daily. Investigational Medication. Patient Study #: [id number]. Take one tablet by mouth once daily.             magnesium oxide (MAG-OX) 400 mg tablet  Take 1 tablet (400 mg total) by mouth 3 (three) times daily.             oxyCODONE  (ROXICODONE) 10 mg Tab immediate release tablet  Take 1 tablet (10 mg total) by mouth every 6 (six) hours as needed for Pain.             pantoprazole (PROTONIX) 40 MG tablet  Take 1 tablet (40 mg total) by mouth once daily.             potassium chloride SA (K-DUR,KLOR-CON) 10 MEQ tablet  Take 2 tablets (20 mEq total) by mouth once daily.             warfarin (COUMADIN) 5 MG tablet  Take 1 tablets (5mg) by mouth on Tues, Thurs, Sat. Take 1.5 tablets (7.5mg) all other days.                 Tim Richards verbalized understanding and had the opportunity to ask questions.   Alert-The patient is alert, awake and responds to voice. The patient is oriented to time, place, and person. The triage nurse is able to obtain subjective information.

## 2018-12-11 NOTE — ED PROVIDER NOTE - ATTENDING CONTRIBUTION TO CARE
Dr. Dm Mccray Dr. Dm Miramontes  pt here for medical evaluation  with ?confusion  pt was at ambulatory psych for follow up of depression    Pt is anxious  over possible admission for care    but can give good account of why he may need help to combat his depression  He is alert and oriented x 3  answerng questions appropriately with sensible speech  no focal findings on neuro exam  no asterixis  clear lungs  unremarkable cardiac exam  benign abd    Plan send repeat labs  check UA given recent history (though at present no physical complaints)  Have psych evaluate

## 2018-12-11 NOTE — ED PROVIDER NOTE - MEDICAL DECISION MAKING DETAILS
well appearing pt without acute medical complaint or pain.  Will screen w/ basic labs, TSH, serum tox, UA, Utox.

## 2018-12-11 NOTE — ED ADULT NURSE NOTE - OBJECTIVE STATEMENT
68yom a&ox4 amb presents to ed 12 sent by [facility] [place] for medical eval. pt went to center for help with his worsening depression. center sent him to ed for medical eval as pt appears "confused." upon arrival, pt is a&ox4, answering questions appropriately, not disorganized or disoriented. pt denies SI/HI, AH/VH. pt offers no physical complaints. breathing even/unlabored. abdomen soft, nontender, nondistended. skin warm, dry, and intact. blood and urine sent to lab. MD at bedside. will continue to monitor.

## 2018-12-11 NOTE — ED ADULT NURSE NOTE - NSIMPLEMENTINTERV_GEN_ALL_ED
Implemented All Universal Safety Interventions:  Alliance to call system. Call bell, personal items and telephone within reach. Instruct patient to call for assistance. Room bathroom lighting operational. Non-slip footwear when patient is off stretcher. Physically safe environment: no spills, clutter or unnecessary equipment. Stretcher in lowest position, wheels locked, appropriate side rails in place.

## 2018-12-11 NOTE — ED PROVIDER NOTE - PROGRESS NOTE DETAILS
Zvi Dubin, MD (pgy-4) note: spoke to Dr Chew who agreed w/our plan to medically clear & send pt to  if & when the labs return without worrisome results. Pt continues to be without symptoms, abnl VS or complaints. EARLE: pt was signed out to me pending  assessment and dispo. Is waiting for [facility] bed for voluntary placement for depression. No acute events overnight, stable, slept but now awake and alert. No complaints. Still awaiting placement.

## 2018-12-11 NOTE — ED PROVIDER NOTE - OBJECTIVE STATEMENT
68y M hx HTN, depression now BIBEMS (EMS report not available at time of ED exam) from [facility] for a medical eval. EMS reports pt. went to [facility] for continued care of MDD, but upon their evaluation pt appeared confused.   Pt reportedly recently diagnosed with UTI and leukocytosis last week. Pt denies fevers, pain of any kind, any urinary sx.  No SI/HI/psychotic fx.          Pt has been seen mult times in this Emergency Department over the past weeks in order to arrange med clearance & outpt psych care.      as per EMT, pt. was sent in ED from Crisis center for medical clearance. 68y M hx HTN, depression now BIBEMS (EMS report not available at time of ED exam) from [facility] center for a medical eval. EMS reports pt. went to [facility] for continued care of MDD, but upon their evaluation pt appeared confused. Pt reportedly recently diagnosed with UTI and leukocytosis last week. On questioning, pt st he is scared to get admitted to the psych hospital but he knows that his depression has worsened & he needs help.  Pt denies fevers, pain of any kind, any urinary sx.  No SI/HI/psychotic fx.          Pt has been seen mult times in this Emergency Department over the past weeks in order to arrange med clearance & outpt psych care.      as per EMT, pt. was sent in ED from Crisis center for medical clearance.

## 2018-12-11 NOTE — ED ADULT TRIAGE NOTE - CHIEF COMPLAINT QUOTE
as per EMT, pt. was sent in ED from Crisis center for medical clearance. EMT reports pt. went to Crisis Center to be admitted for MDD, but upon their evaluation pt. "seems confuse". pt. recently diagnosed with UTI and leukocytosis last week. pt. calm and cooperative in triage, a&ox3, denies any pain nor fever nor dysuria. denies any SI/HI/hallucinations. PMHx: HTN, Depression. as per  ALIZA Grubbs, pt. to be seen in Main ED.

## 2018-12-11 NOTE — ED PROVIDER NOTE - PHYSICAL EXAMINATION
No abd bruits or thrills.  No pulsatile abd masses.  No pulse deficits x4 ext.   PERRLA 4mm b/l  moist mucous membranes   no clonus, rigidity or hyperreflexia  No overt diaphoresis or notable skin dryness    answering questions appropriately

## 2018-12-12 DIAGNOSIS — F33.9 MAJOR DEPRESSIVE DISORDER, RECURRENT, UNSPECIFIED: ICD-10-CM

## 2018-12-12 PROCEDURE — 99285 EMERGENCY DEPT VISIT HI MDM: CPT

## 2018-12-12 RX ORDER — METOPROLOL TARTRATE 50 MG
50 TABLET ORAL ONCE
Qty: 0 | Refills: 0 | Status: COMPLETED | OUTPATIENT
Start: 2018-12-12 | End: 2018-12-12

## 2018-12-12 RX ORDER — HYDROCHLOROTHIAZIDE 25 MG
12.5 TABLET ORAL DAILY
Qty: 0 | Refills: 0 | Status: DISCONTINUED | OUTPATIENT
Start: 2018-12-12 | End: 2018-12-13

## 2018-12-12 RX ORDER — HYDROCHLOROTHIAZIDE 25 MG
12.5 TABLET ORAL ONCE
Qty: 0 | Refills: 0 | Status: COMPLETED | OUTPATIENT
Start: 2018-12-12 | End: 2018-12-12

## 2018-12-12 RX ORDER — ESCITALOPRAM OXALATE 10 MG/1
5 TABLET, FILM COATED ORAL DAILY
Qty: 0 | Refills: 0 | Status: DISCONTINUED | OUTPATIENT
Start: 2018-12-13 | End: 2018-12-14

## 2018-12-12 RX ORDER — RISPERIDONE 4 MG/1
1 TABLET ORAL AT BEDTIME
Qty: 0 | Refills: 0 | Status: DISCONTINUED | OUTPATIENT
Start: 2018-12-12 | End: 2018-12-14

## 2018-12-12 RX ORDER — LOSARTAN POTASSIUM 100 MG/1
100 TABLET, FILM COATED ORAL DAILY
Qty: 0 | Refills: 0 | Status: DISCONTINUED | OUTPATIENT
Start: 2018-12-12 | End: 2018-12-13

## 2018-12-12 RX ORDER — METOPROLOL TARTRATE 50 MG
50 TABLET ORAL DAILY
Qty: 0 | Refills: 0 | Status: DISCONTINUED | OUTPATIENT
Start: 2018-12-12 | End: 2018-12-24

## 2018-12-12 RX ADMIN — Medication 50 MILLIGRAM(S): at 05:49

## 2018-12-12 RX ADMIN — RISPERIDONE 1 MILLIGRAM(S): 4 TABLET ORAL at 20:31

## 2018-12-12 RX ADMIN — Medication 12.5 MILLIGRAM(S): at 05:49

## 2018-12-12 NOTE — ED BEHAVIORAL HEALTH ASSESSMENT NOTE - SUMMARY
69 yo single  male, no dependents, SSI, retired , domiciled alone, with history of depression and anxiety, 1 prior inpatient admission in 1997, no history of suicide attempts or NSSIB, no drug abuse or legal issues, presented to the [facility] ED 12/2/18 and 12/4/18 referred to the crisis clinic. He presented to the crisis clinic on 12/11/18 where he was recommended to come to the hospital for voluntary inpatient admission. Patient continues to show decline in functioning and is now requesting voluntary inpatient admission for stabilization of his depressive symptoms and medication adjustment.

## 2018-12-12 NOTE — ED BEHAVIORAL HEALTH ASSESSMENT NOTE - HPI (INCLUDE ILLNESS QUALITY, SEVERITY, DURATION, TIMING, CONTEXT, MODIFYING FACTORS, ASSOCIATED SIGNS AND SYMPTOMS)
67 yo single  male, no dependents, SSI, retired , domiciled alone, with history of depression and anxiety, 1 prior inpatient admission in 1997, no history of suicide attempts or NSSIB, no drug abuse or legal issues, presented to the [facility] ED 12/2/18 and 12/4/18 referred to the crisis clinic. He presented to the crisis clinic on 12/11/18 where he was recommended to come to the hospital for voluntary inpatient admission.       Patient informs MD that he is ready to get help for his depression. He states "I signed paperwork at the crisis clinic and they are holding a bed on the [facility] unit for me". He went to the crisis clinic today where he presented disheveled, confused and tearful. He states he continues to feel depressed and not able to care for himself. He states he is not taking the Lexapro due to fear of potential side effects. However he is requesting to be admitted to the hospital as he feels medication adjustments can occur quicker inpatient than outpatient.

## 2018-12-12 NOTE — ED ADULT NURSE REASSESSMENT NOTE - GENERAL PATIENT STATE
pt received pt from Main ed, awake, ambulating unassisted, confused and requiring verbal redirection. Pt at this time, resting comfortably, NAD.

## 2018-12-12 NOTE — ED ADULT NURSE REASSESSMENT NOTE - NS ED NURSE REASSESS COMMENT FT1
0835am Received report from night RN pt calm & cooperative c/o depression pt currently denies Si/Hi/AVh,  pt tolerated breakfast safety & comfort measures maintained eval on going.  1100 Pt made aware of admission to [address] pt currently denies Si/Hi/AVh pt transported via security accompanied by a PES.

## 2018-12-12 NOTE — ED ADULT NURSE REASSESSMENT NOTE - STATUS
awaiting bed, no change/awaiting bed for [facility] admission, medically cleared

## 2018-12-13 LAB
BACTERIA UR CULT: SIGNIFICANT CHANGE UP
CHOLEST SERPL-MCNC: 84 MG/DL — LOW (ref 120–199)
HBA1C BLD-MCNC: 5.8 % — HIGH (ref 4–5.6)
HDLC SERPL-MCNC: 42 MG/DL — SIGNIFICANT CHANGE UP (ref 35–55)
LIPID PNL WITH DIRECT LDL SERPL: 37 MG/DL — SIGNIFICANT CHANGE UP
SPECIMEN SOURCE: SIGNIFICANT CHANGE UP
TRIGL SERPL-MCNC: 71 MG/DL — SIGNIFICANT CHANGE UP (ref 10–149)

## 2018-12-13 PROCEDURE — 99222 1ST HOSP IP/OBS MODERATE 55: CPT

## 2018-12-13 RX ORDER — ERGOCALCIFEROL 1.25 MG/1
50000 CAPSULE ORAL
Qty: 0 | Refills: 0 | Status: DISCONTINUED | OUTPATIENT
Start: 2018-12-16 | End: 2018-12-24

## 2018-12-13 RX ORDER — ASPIRIN/CALCIUM CARB/MAGNESIUM 324 MG
81 TABLET ORAL DAILY
Qty: 0 | Refills: 0 | Status: DISCONTINUED | OUTPATIENT
Start: 2018-12-13 | End: 2018-12-24

## 2018-12-13 RX ORDER — FINASTERIDE 5 MG/1
5 TABLET, FILM COATED ORAL DAILY
Qty: 0 | Refills: 0 | Status: DISCONTINUED | OUTPATIENT
Start: 2018-12-13 | End: 2018-12-24

## 2018-12-13 RX ORDER — VITAMIN E 100 UNIT
200 CAPSULE ORAL DAILY
Qty: 0 | Refills: 0 | Status: DISCONTINUED | OUTPATIENT
Start: 2018-12-13 | End: 2018-12-21

## 2018-12-13 RX ORDER — ASCORBIC ACID 60 MG
500 TABLET,CHEWABLE ORAL DAILY
Qty: 0 | Refills: 0 | Status: DISCONTINUED | OUTPATIENT
Start: 2018-12-13 | End: 2018-12-21

## 2018-12-13 RX ORDER — ATORVASTATIN CALCIUM 80 MG/1
80 TABLET, FILM COATED ORAL AT BEDTIME
Qty: 0 | Refills: 0 | Status: DISCONTINUED | OUTPATIENT
Start: 2018-12-13 | End: 2018-12-24

## 2018-12-13 RX ORDER — TAMSULOSIN HYDROCHLORIDE 0.4 MG/1
0.4 CAPSULE ORAL AT BEDTIME
Qty: 0 | Refills: 0 | Status: DISCONTINUED | OUTPATIENT
Start: 2018-12-13 | End: 2018-12-24

## 2018-12-13 RX ORDER — HYDROCHLOROTHIAZIDE 25 MG
12.5 TABLET ORAL DAILY
Qty: 0 | Refills: 0 | Status: DISCONTINUED | OUTPATIENT
Start: 2018-12-13 | End: 2018-12-24

## 2018-12-13 RX ORDER — PREGABALIN 225 MG/1
1000 CAPSULE ORAL DAILY
Qty: 0 | Refills: 0 | Status: DISCONTINUED | OUTPATIENT
Start: 2018-12-13 | End: 2018-12-21

## 2018-12-13 RX ORDER — HYDROCHLOROTHIAZIDE 25 MG
12.5 TABLET ORAL DAILY
Qty: 0 | Refills: 0 | Status: DISCONTINUED | OUTPATIENT
Start: 2018-12-13 | End: 2018-12-13

## 2018-12-13 RX ADMIN — ATORVASTATIN CALCIUM 80 MILLIGRAM(S): 80 TABLET, FILM COATED ORAL at 21:18

## 2018-12-13 RX ADMIN — Medication 0.1 MILLIGRAM(S): at 21:18

## 2018-12-13 RX ADMIN — TAMSULOSIN HYDROCHLORIDE 0.4 MILLIGRAM(S): 0.4 CAPSULE ORAL at 21:18

## 2018-12-13 RX ADMIN — RISPERIDONE 1 MILLIGRAM(S): 4 TABLET ORAL at 21:18

## 2018-12-13 RX ADMIN — Medication 0.1 MILLIGRAM(S): at 09:15

## 2018-12-14 PROCEDURE — 99232 SBSQ HOSP IP/OBS MODERATE 35: CPT | Mod: GC

## 2018-12-14 RX ORDER — QUETIAPINE FUMARATE 200 MG/1
50 TABLET, FILM COATED ORAL AT BEDTIME
Qty: 0 | Refills: 0 | Status: DISCONTINUED | OUTPATIENT
Start: 2018-12-14 | End: 2018-12-16

## 2018-12-14 RX ORDER — AMLODIPINE BESYLATE 2.5 MG/1
2.5 TABLET ORAL ONCE
Qty: 0 | Refills: 0 | Status: COMPLETED | OUTPATIENT
Start: 2018-12-14 | End: 2018-12-14

## 2018-12-14 RX ADMIN — ATORVASTATIN CALCIUM 80 MILLIGRAM(S): 80 TABLET, FILM COATED ORAL at 21:07

## 2018-12-14 RX ADMIN — QUETIAPINE FUMARATE 50 MILLIGRAM(S): 200 TABLET, FILM COATED ORAL at 21:07

## 2018-12-14 RX ADMIN — Medication 0.1 MILLIGRAM(S): at 21:07

## 2018-12-14 RX ADMIN — Medication 500 MILLIGRAM(S): at 09:06

## 2018-12-14 RX ADMIN — Medication 0.1 MILLIGRAM(S): at 09:07

## 2018-12-14 RX ADMIN — Medication 50 MILLIGRAM(S): at 09:07

## 2018-12-14 RX ADMIN — FINASTERIDE 5 MILLIGRAM(S): 5 TABLET, FILM COATED ORAL at 09:07

## 2018-12-14 RX ADMIN — Medication 81 MILLIGRAM(S): at 09:07

## 2018-12-14 RX ADMIN — ESCITALOPRAM OXALATE 5 MILLIGRAM(S): 10 TABLET, FILM COATED ORAL at 09:07

## 2018-12-14 RX ADMIN — Medication 12.5 MILLIGRAM(S): at 09:07

## 2018-12-14 RX ADMIN — Medication 200 INTERNATIONAL UNIT(S): at 13:29

## 2018-12-14 RX ADMIN — PREGABALIN 1000 MICROGRAM(S): 225 CAPSULE ORAL at 09:07

## 2018-12-14 RX ADMIN — AMLODIPINE BESYLATE 2.5 MILLIGRAM(S): 2.5 TABLET ORAL at 21:11

## 2018-12-14 RX ADMIN — TAMSULOSIN HYDROCHLORIDE 0.4 MILLIGRAM(S): 0.4 CAPSULE ORAL at 21:07

## 2018-12-15 PROCEDURE — 99231 SBSQ HOSP IP/OBS SF/LOW 25: CPT

## 2018-12-15 RX ADMIN — ATORVASTATIN CALCIUM 80 MILLIGRAM(S): 80 TABLET, FILM COATED ORAL at 20:47

## 2018-12-15 RX ADMIN — Medication 12.5 MILLIGRAM(S): at 08:53

## 2018-12-15 RX ADMIN — FINASTERIDE 5 MILLIGRAM(S): 5 TABLET, FILM COATED ORAL at 08:53

## 2018-12-15 RX ADMIN — Medication 0.1 MILLIGRAM(S): at 20:47

## 2018-12-15 RX ADMIN — Medication 1 MILLIGRAM(S): at 20:45

## 2018-12-15 RX ADMIN — QUETIAPINE FUMARATE 50 MILLIGRAM(S): 200 TABLET, FILM COATED ORAL at 20:46

## 2018-12-15 RX ADMIN — Medication 200 INTERNATIONAL UNIT(S): at 08:53

## 2018-12-15 RX ADMIN — Medication 50 MILLIGRAM(S): at 08:53

## 2018-12-15 RX ADMIN — Medication 500 MILLIGRAM(S): at 08:50

## 2018-12-15 RX ADMIN — Medication 81 MILLIGRAM(S): at 08:52

## 2018-12-15 RX ADMIN — TAMSULOSIN HYDROCHLORIDE 0.4 MILLIGRAM(S): 0.4 CAPSULE ORAL at 20:46

## 2018-12-15 RX ADMIN — Medication 0.1 MILLIGRAM(S): at 08:52

## 2018-12-15 RX ADMIN — PREGABALIN 1000 MICROGRAM(S): 225 CAPSULE ORAL at 08:53

## 2018-12-16 PROCEDURE — 99231 SBSQ HOSP IP/OBS SF/LOW 25: CPT

## 2018-12-16 RX ORDER — QUETIAPINE FUMARATE 200 MG/1
75 TABLET, FILM COATED ORAL AT BEDTIME
Qty: 0 | Refills: 0 | Status: DISCONTINUED | OUTPATIENT
Start: 2018-12-16 | End: 2018-12-20

## 2018-12-16 RX ORDER — HYDRALAZINE HCL 50 MG
10 TABLET ORAL ONCE
Qty: 0 | Refills: 0 | Status: COMPLETED | OUTPATIENT
Start: 2018-12-16 | End: 2018-12-16

## 2018-12-16 RX ADMIN — Medication 500 MILLIGRAM(S): at 08:49

## 2018-12-16 RX ADMIN — FINASTERIDE 5 MILLIGRAM(S): 5 TABLET, FILM COATED ORAL at 08:49

## 2018-12-16 RX ADMIN — ATORVASTATIN CALCIUM 80 MILLIGRAM(S): 80 TABLET, FILM COATED ORAL at 21:56

## 2018-12-16 RX ADMIN — ERGOCALCIFEROL 50000 UNIT(S): 1.25 CAPSULE ORAL at 08:49

## 2018-12-16 RX ADMIN — PREGABALIN 1000 MICROGRAM(S): 225 CAPSULE ORAL at 08:49

## 2018-12-16 RX ADMIN — Medication 12.5 MILLIGRAM(S): at 08:50

## 2018-12-16 RX ADMIN — Medication 0.1 MILLIGRAM(S): at 21:56

## 2018-12-16 RX ADMIN — QUETIAPINE FUMARATE 75 MILLIGRAM(S): 200 TABLET, FILM COATED ORAL at 21:56

## 2018-12-16 RX ADMIN — Medication 0.1 MILLIGRAM(S): at 08:49

## 2018-12-16 RX ADMIN — Medication 50 MILLIGRAM(S): at 08:50

## 2018-12-16 RX ADMIN — Medication 200 INTERNATIONAL UNIT(S): at 08:50

## 2018-12-16 RX ADMIN — Medication 10 MILLIGRAM(S): at 21:56

## 2018-12-16 RX ADMIN — Medication 81 MILLIGRAM(S): at 08:49

## 2018-12-16 RX ADMIN — TAMSULOSIN HYDROCHLORIDE 0.4 MILLIGRAM(S): 0.4 CAPSULE ORAL at 21:56

## 2018-12-17 PROCEDURE — 99232 SBSQ HOSP IP/OBS MODERATE 35: CPT

## 2018-12-17 RX ORDER — ESCITALOPRAM OXALATE 10 MG/1
5 TABLET, FILM COATED ORAL DAILY
Qty: 0 | Refills: 0 | Status: DISCONTINUED | OUTPATIENT
Start: 2018-12-17 | End: 2018-12-19

## 2018-12-17 RX ORDER — ESCITALOPRAM OXALATE 10 MG/1
5 TABLET, FILM COATED ORAL ONCE
Qty: 0 | Refills: 0 | Status: COMPLETED | OUTPATIENT
Start: 2018-12-17 | End: 2018-12-17

## 2018-12-17 RX ADMIN — Medication 0.1 MILLIGRAM(S): at 21:03

## 2018-12-17 RX ADMIN — ESCITALOPRAM OXALATE 5 MILLIGRAM(S): 10 TABLET, FILM COATED ORAL at 14:44

## 2018-12-17 RX ADMIN — Medication 0.1 MILLIGRAM(S): at 08:27

## 2018-12-17 RX ADMIN — Medication 50 MILLIGRAM(S): at 08:27

## 2018-12-17 RX ADMIN — QUETIAPINE FUMARATE 75 MILLIGRAM(S): 200 TABLET, FILM COATED ORAL at 21:03

## 2018-12-17 RX ADMIN — Medication 81 MILLIGRAM(S): at 08:27

## 2018-12-17 RX ADMIN — TAMSULOSIN HYDROCHLORIDE 0.4 MILLIGRAM(S): 0.4 CAPSULE ORAL at 21:03

## 2018-12-17 RX ADMIN — FINASTERIDE 5 MILLIGRAM(S): 5 TABLET, FILM COATED ORAL at 08:27

## 2018-12-17 RX ADMIN — Medication 12.5 MILLIGRAM(S): at 08:27

## 2018-12-17 RX ADMIN — Medication 500 MILLIGRAM(S): at 08:27

## 2018-12-17 RX ADMIN — Medication 200 INTERNATIONAL UNIT(S): at 08:27

## 2018-12-17 RX ADMIN — ATORVASTATIN CALCIUM 80 MILLIGRAM(S): 80 TABLET, FILM COATED ORAL at 21:03

## 2018-12-17 RX ADMIN — Medication 0.1 MILLIGRAM(S): at 12:25

## 2018-12-17 RX ADMIN — PREGABALIN 1000 MICROGRAM(S): 225 CAPSULE ORAL at 08:27

## 2018-12-18 PROCEDURE — 99232 SBSQ HOSP IP/OBS MODERATE 35: CPT | Mod: GC

## 2018-12-18 RX ADMIN — Medication 0.1 MILLIGRAM(S): at 13:14

## 2018-12-18 RX ADMIN — TAMSULOSIN HYDROCHLORIDE 0.4 MILLIGRAM(S): 0.4 CAPSULE ORAL at 21:10

## 2018-12-18 RX ADMIN — Medication 500 MILLIGRAM(S): at 09:05

## 2018-12-18 RX ADMIN — QUETIAPINE FUMARATE 75 MILLIGRAM(S): 200 TABLET, FILM COATED ORAL at 21:10

## 2018-12-18 RX ADMIN — Medication 12.5 MILLIGRAM(S): at 09:05

## 2018-12-18 RX ADMIN — Medication 0.1 MILLIGRAM(S): at 09:05

## 2018-12-18 RX ADMIN — Medication 50 MILLIGRAM(S): at 09:05

## 2018-12-18 RX ADMIN — PREGABALIN 1000 MICROGRAM(S): 225 CAPSULE ORAL at 09:05

## 2018-12-18 RX ADMIN — ATORVASTATIN CALCIUM 80 MILLIGRAM(S): 80 TABLET, FILM COATED ORAL at 21:10

## 2018-12-18 RX ADMIN — ESCITALOPRAM OXALATE 5 MILLIGRAM(S): 10 TABLET, FILM COATED ORAL at 09:05

## 2018-12-18 RX ADMIN — Medication 81 MILLIGRAM(S): at 09:05

## 2018-12-18 RX ADMIN — FINASTERIDE 5 MILLIGRAM(S): 5 TABLET, FILM COATED ORAL at 09:05

## 2018-12-18 RX ADMIN — Medication 0.1 MILLIGRAM(S): at 21:10

## 2018-12-18 RX ADMIN — Medication 200 INTERNATIONAL UNIT(S): at 09:05

## 2018-12-19 LAB
FOLATE SERPL-MCNC: 14.5 NG/ML — SIGNIFICANT CHANGE UP (ref 4.7–20)
VIT B12 SERPL-MCNC: 454 PG/ML — SIGNIFICANT CHANGE UP (ref 200–900)

## 2018-12-19 PROCEDURE — 99232 SBSQ HOSP IP/OBS MODERATE 35: CPT | Mod: GC

## 2018-12-19 RX ORDER — ESCITALOPRAM OXALATE 10 MG/1
10 TABLET, FILM COATED ORAL DAILY
Qty: 0 | Refills: 0 | Status: DISCONTINUED | OUTPATIENT
Start: 2018-12-19 | End: 2018-12-24

## 2018-12-19 RX ADMIN — ESCITALOPRAM OXALATE 5 MILLIGRAM(S): 10 TABLET, FILM COATED ORAL at 09:38

## 2018-12-19 RX ADMIN — Medication 0.1 MILLIGRAM(S): at 12:46

## 2018-12-19 RX ADMIN — QUETIAPINE FUMARATE 75 MILLIGRAM(S): 200 TABLET, FILM COATED ORAL at 21:04

## 2018-12-19 RX ADMIN — Medication 0.5 MILLIGRAM(S): at 21:04

## 2018-12-19 RX ADMIN — Medication 0.1 MILLIGRAM(S): at 21:04

## 2018-12-19 RX ADMIN — ATORVASTATIN CALCIUM 80 MILLIGRAM(S): 80 TABLET, FILM COATED ORAL at 21:04

## 2018-12-19 RX ADMIN — Medication 81 MILLIGRAM(S): at 09:38

## 2018-12-19 RX ADMIN — TAMSULOSIN HYDROCHLORIDE 0.4 MILLIGRAM(S): 0.4 CAPSULE ORAL at 21:04

## 2018-12-19 RX ADMIN — Medication 0.1 MILLIGRAM(S): at 09:38

## 2018-12-19 RX ADMIN — Medication 1 MILLIGRAM(S): at 12:46

## 2018-12-19 RX ADMIN — Medication 500 MILLIGRAM(S): at 09:38

## 2018-12-19 RX ADMIN — Medication 200 INTERNATIONAL UNIT(S): at 09:38

## 2018-12-19 RX ADMIN — FINASTERIDE 5 MILLIGRAM(S): 5 TABLET, FILM COATED ORAL at 09:38

## 2018-12-19 RX ADMIN — Medication 50 MILLIGRAM(S): at 09:38

## 2018-12-19 RX ADMIN — PREGABALIN 1000 MICROGRAM(S): 225 CAPSULE ORAL at 09:38

## 2018-12-19 RX ADMIN — Medication 12.5 MILLIGRAM(S): at 09:38

## 2018-12-20 PROCEDURE — 99232 SBSQ HOSP IP/OBS MODERATE 35: CPT | Mod: GC

## 2018-12-20 RX ORDER — RISPERIDONE 4 MG/1
1 TABLET ORAL AT BEDTIME
Qty: 0 | Refills: 0 | Status: DISCONTINUED | OUTPATIENT
Start: 2018-12-20 | End: 2018-12-24

## 2018-12-20 RX ADMIN — Medication 12.5 MILLIGRAM(S): at 09:36

## 2018-12-20 RX ADMIN — Medication 0.1 MILLIGRAM(S): at 21:03

## 2018-12-20 RX ADMIN — Medication 50 MILLIGRAM(S): at 09:36

## 2018-12-20 RX ADMIN — PREGABALIN 1000 MICROGRAM(S): 225 CAPSULE ORAL at 09:36

## 2018-12-20 RX ADMIN — Medication 500 MILLIGRAM(S): at 09:36

## 2018-12-20 RX ADMIN — ATORVASTATIN CALCIUM 80 MILLIGRAM(S): 80 TABLET, FILM COATED ORAL at 21:03

## 2018-12-20 RX ADMIN — FINASTERIDE 5 MILLIGRAM(S): 5 TABLET, FILM COATED ORAL at 09:36

## 2018-12-20 RX ADMIN — Medication 0.5 MILLIGRAM(S): at 09:36

## 2018-12-20 RX ADMIN — Medication 200 INTERNATIONAL UNIT(S): at 09:37

## 2018-12-20 RX ADMIN — ESCITALOPRAM OXALATE 10 MILLIGRAM(S): 10 TABLET, FILM COATED ORAL at 09:36

## 2018-12-20 RX ADMIN — Medication 81 MILLIGRAM(S): at 09:36

## 2018-12-20 RX ADMIN — RISPERIDONE 1 MILLIGRAM(S): 4 TABLET ORAL at 21:03

## 2018-12-20 RX ADMIN — Medication 0.5 MILLIGRAM(S): at 21:03

## 2018-12-20 RX ADMIN — Medication 0.1 MILLIGRAM(S): at 09:36

## 2018-12-20 RX ADMIN — TAMSULOSIN HYDROCHLORIDE 0.4 MILLIGRAM(S): 0.4 CAPSULE ORAL at 21:03

## 2018-12-20 RX ADMIN — Medication 0.1 MILLIGRAM(S): at 14:27

## 2018-12-21 PROCEDURE — 99232 SBSQ HOSP IP/OBS MODERATE 35: CPT | Mod: GC

## 2018-12-21 RX ORDER — BENZTROPINE MESYLATE 1 MG
0.5 TABLET ORAL
Qty: 0 | Refills: 0 | Status: DISCONTINUED | OUTPATIENT
Start: 2018-12-21 | End: 2018-12-24

## 2018-12-21 RX ADMIN — Medication 500 MILLIGRAM(S): at 08:50

## 2018-12-21 RX ADMIN — Medication 0.1 MILLIGRAM(S): at 08:50

## 2018-12-21 RX ADMIN — Medication 200 INTERNATIONAL UNIT(S): at 08:49

## 2018-12-21 RX ADMIN — TAMSULOSIN HYDROCHLORIDE 0.4 MILLIGRAM(S): 0.4 CAPSULE ORAL at 21:43

## 2018-12-21 RX ADMIN — FINASTERIDE 5 MILLIGRAM(S): 5 TABLET, FILM COATED ORAL at 08:50

## 2018-12-21 RX ADMIN — ATORVASTATIN CALCIUM 80 MILLIGRAM(S): 80 TABLET, FILM COATED ORAL at 21:42

## 2018-12-21 RX ADMIN — ESCITALOPRAM OXALATE 10 MILLIGRAM(S): 10 TABLET, FILM COATED ORAL at 08:50

## 2018-12-21 RX ADMIN — RISPERIDONE 1 MILLIGRAM(S): 4 TABLET ORAL at 21:43

## 2018-12-21 RX ADMIN — PREGABALIN 1000 MICROGRAM(S): 225 CAPSULE ORAL at 08:50

## 2018-12-21 RX ADMIN — Medication 50 MILLIGRAM(S): at 08:50

## 2018-12-21 RX ADMIN — Medication 0.5 MILLIGRAM(S): at 21:43

## 2018-12-21 RX ADMIN — Medication 0.5 MILLIGRAM(S): at 21:42

## 2018-12-21 RX ADMIN — Medication 12.5 MILLIGRAM(S): at 08:50

## 2018-12-21 RX ADMIN — Medication 0.1 MILLIGRAM(S): at 21:42

## 2018-12-21 RX ADMIN — Medication 81 MILLIGRAM(S): at 08:50

## 2018-12-21 RX ADMIN — Medication 1 TABLET(S): at 12:37

## 2018-12-21 RX ADMIN — Medication 0.1 MILLIGRAM(S): at 12:37

## 2018-12-21 RX ADMIN — Medication 0.5 MILLIGRAM(S): at 08:50

## 2018-12-22 PROCEDURE — 99231 SBSQ HOSP IP/OBS SF/LOW 25: CPT

## 2018-12-22 RX ADMIN — Medication 50 MILLIGRAM(S): at 09:58

## 2018-12-22 RX ADMIN — Medication 0.1 MILLIGRAM(S): at 21:37

## 2018-12-22 RX ADMIN — Medication 0.5 MILLIGRAM(S): at 21:37

## 2018-12-22 RX ADMIN — Medication 1 TABLET(S): at 09:58

## 2018-12-22 RX ADMIN — RISPERIDONE 1 MILLIGRAM(S): 4 TABLET ORAL at 21:37

## 2018-12-22 RX ADMIN — Medication 81 MILLIGRAM(S): at 09:57

## 2018-12-22 RX ADMIN — ESCITALOPRAM OXALATE 10 MILLIGRAM(S): 10 TABLET, FILM COATED ORAL at 09:58

## 2018-12-22 RX ADMIN — Medication 0.1 MILLIGRAM(S): at 14:29

## 2018-12-22 RX ADMIN — Medication 0.5 MILLIGRAM(S): at 09:58

## 2018-12-22 RX ADMIN — Medication 0.5 MILLIGRAM(S): at 09:57

## 2018-12-22 RX ADMIN — TAMSULOSIN HYDROCHLORIDE 0.4 MILLIGRAM(S): 0.4 CAPSULE ORAL at 21:37

## 2018-12-22 RX ADMIN — Medication 12.5 MILLIGRAM(S): at 09:58

## 2018-12-22 RX ADMIN — Medication 0.1 MILLIGRAM(S): at 09:57

## 2018-12-22 RX ADMIN — FINASTERIDE 5 MILLIGRAM(S): 5 TABLET, FILM COATED ORAL at 09:58

## 2018-12-22 RX ADMIN — ATORVASTATIN CALCIUM 80 MILLIGRAM(S): 80 TABLET, FILM COATED ORAL at 21:37

## 2018-12-23 PROCEDURE — 99231 SBSQ HOSP IP/OBS SF/LOW 25: CPT

## 2018-12-23 RX ORDER — ATORVASTATIN CALCIUM 80 MG/1
1 TABLET, FILM COATED ORAL
Qty: 0 | Refills: 0 | DISCHARGE
Start: 2018-12-23

## 2018-12-23 RX ORDER — HYDROCHLOROTHIAZIDE 25 MG
1 TABLET ORAL
Qty: 0 | Refills: 0 | DISCHARGE
Start: 2018-12-23

## 2018-12-23 RX ORDER — ESCITALOPRAM OXALATE 10 MG/1
1 TABLET, FILM COATED ORAL
Qty: 14 | Refills: 0
Start: 2018-12-23 | End: 2019-01-05

## 2018-12-23 RX ORDER — METOPROLOL TARTRATE 50 MG
1 TABLET ORAL
Qty: 0 | Refills: 0 | DISCHARGE
Start: 2018-12-23

## 2018-12-23 RX ORDER — ASPIRIN/CALCIUM CARB/MAGNESIUM 324 MG
1 TABLET ORAL
Qty: 0 | Refills: 0 | DISCHARGE
Start: 2018-12-23

## 2018-12-23 RX ORDER — RISPERIDONE 4 MG/1
1 TABLET ORAL
Qty: 14 | Refills: 0
Start: 2018-12-23 | End: 2019-01-05

## 2018-12-23 RX ORDER — BENZTROPINE MESYLATE 1 MG
1 TABLET ORAL
Qty: 28 | Refills: 0
Start: 2018-12-23 | End: 2019-01-05

## 2018-12-23 RX ORDER — FINASTERIDE 5 MG/1
1 TABLET, FILM COATED ORAL
Qty: 0 | Refills: 0 | DISCHARGE
Start: 2018-12-23

## 2018-12-23 RX ORDER — TAMSULOSIN HYDROCHLORIDE 0.4 MG/1
1 CAPSULE ORAL
Qty: 0 | Refills: 0 | DISCHARGE
Start: 2018-12-23

## 2018-12-23 RX ADMIN — Medication 0.5 MILLIGRAM(S): at 21:12

## 2018-12-23 RX ADMIN — FINASTERIDE 5 MILLIGRAM(S): 5 TABLET, FILM COATED ORAL at 09:23

## 2018-12-23 RX ADMIN — Medication 50 MILLIGRAM(S): at 09:23

## 2018-12-23 RX ADMIN — TAMSULOSIN HYDROCHLORIDE 0.4 MILLIGRAM(S): 0.4 CAPSULE ORAL at 21:12

## 2018-12-23 RX ADMIN — Medication 12.5 MILLIGRAM(S): at 09:23

## 2018-12-23 RX ADMIN — Medication 81 MILLIGRAM(S): at 09:22

## 2018-12-23 RX ADMIN — Medication 0.1 MILLIGRAM(S): at 09:23

## 2018-12-23 RX ADMIN — ATORVASTATIN CALCIUM 80 MILLIGRAM(S): 80 TABLET, FILM COATED ORAL at 21:12

## 2018-12-23 RX ADMIN — Medication 0.1 MILLIGRAM(S): at 21:12

## 2018-12-23 RX ADMIN — Medication 0.5 MILLIGRAM(S): at 09:23

## 2018-12-23 RX ADMIN — Medication 0.5 MILLIGRAM(S): at 09:22

## 2018-12-23 RX ADMIN — ERGOCALCIFEROL 50000 UNIT(S): 1.25 CAPSULE ORAL at 13:06

## 2018-12-23 RX ADMIN — Medication 1 TABLET(S): at 09:23

## 2018-12-23 RX ADMIN — Medication 0.1 MILLIGRAM(S): at 13:04

## 2018-12-23 RX ADMIN — RISPERIDONE 1 MILLIGRAM(S): 4 TABLET ORAL at 21:12

## 2018-12-23 RX ADMIN — ESCITALOPRAM OXALATE 10 MILLIGRAM(S): 10 TABLET, FILM COATED ORAL at 09:23

## 2018-12-24 VITALS — TEMPERATURE: 98 F

## 2018-12-24 PROCEDURE — 99238 HOSP IP/OBS DSCHRG MGMT 30/<: CPT

## 2018-12-24 RX ADMIN — Medication 81 MILLIGRAM(S): at 09:14

## 2018-12-24 RX ADMIN — Medication 50 MILLIGRAM(S): at 09:13

## 2018-12-24 RX ADMIN — FINASTERIDE 5 MILLIGRAM(S): 5 TABLET, FILM COATED ORAL at 09:13

## 2018-12-24 RX ADMIN — Medication 12.5 MILLIGRAM(S): at 09:13

## 2018-12-24 RX ADMIN — Medication 1 TABLET(S): at 09:13

## 2018-12-24 RX ADMIN — Medication 0.1 MILLIGRAM(S): at 09:14

## 2018-12-24 RX ADMIN — ESCITALOPRAM OXALATE 10 MILLIGRAM(S): 10 TABLET, FILM COATED ORAL at 09:14

## 2018-12-24 RX ADMIN — Medication 0.5 MILLIGRAM(S): at 09:14

## 2018-12-24 RX ADMIN — Medication 0.5 MILLIGRAM(S): at 09:13

## 2018-12-27 ENCOUNTER — OUTPATIENT (OUTPATIENT)
Dept: OUTPATIENT SERVICES | Facility: HOSPITAL | Age: 68
LOS: 1 days | Discharge: HOME | End: 2018-12-27
Payer: MEDICARE

## 2018-12-28 DIAGNOSIS — F33.9 MAJOR DEPRESSIVE DISORDER, RECURRENT, UNSPECIFIED: ICD-10-CM

## 2018-12-28 DIAGNOSIS — E78.5 HYPERLIPIDEMIA, UNSPECIFIED: ICD-10-CM

## 2018-12-28 DIAGNOSIS — F41.9 ANXIETY DISORDER, UNSPECIFIED: ICD-10-CM

## 2018-12-28 DIAGNOSIS — I10 ESSENTIAL (PRIMARY) HYPERTENSION: ICD-10-CM

## 2018-12-28 DIAGNOSIS — N40.0 BENIGN PROSTATIC HYPERPLASIA WITHOUT LOWER URINARY TRACT SYMPTOMS: ICD-10-CM

## 2021-01-21 PROCEDURE — 99442: CPT

## 2021-02-25 PROCEDURE — 99442: CPT

## 2021-03-25 PROCEDURE — 99443: CPT

## 2021-04-22 PROCEDURE — 99443: CPT

## 2021-05-27 PROCEDURE — 99443: CPT

## 2021-06-23 PROCEDURE — 99443: CPT

## 2021-07-16 PROCEDURE — 99443: CPT

## 2021-08-18 PROCEDURE — 99443: CPT

## 2021-09-20 PROCEDURE — 99443: CPT

## 2021-10-25 PROCEDURE — 99443: CPT

## 2021-11-22 PROCEDURE — 99443: CPT | Mod: 95

## 2021-12-23 PROCEDURE — 99443: CPT

## 2022-01-19 PROCEDURE — 99443: CPT

## 2022-02-16 PROCEDURE — 99443: CPT

## 2022-03-16 PROCEDURE — 99443: CPT

## 2022-03-16 NOTE — ED BEHAVIORAL HEALTH ASSESSMENT NOTE - ADDITIONAL DETAILS / COMMENTS
Quality 130: Documentation Of Current Medications In The Medical Record: Current Medications Documented Detail Level: Detailed Additional collateral INFO: NOTE:   Patent briefly seen in  ED as per request of attending. Patient reports that he is not user why he was asked to be seen psychiatrically. He denies active suicidal ideation, denies homicidal ideation, noted past psychiatric history of inpatient hospitalization at [facility] about 20 years ago due to stressors involving the relocation of his parents to [place] at that time. There were concerns by medical ED attending who expressed that patient has not been able to care for self but patient denies this stating that he has been able to attend to ADLs and has access to food. Patient denies any current substance including denial of cannabis, street benzodiazepine use, LCD, PCP, cocaine, alcohol use or any other drug use but reports prior history of alcohol use (last alcohol use Jan 2018 of 2-3 beers). He denies of past rehab visits, seizures, complicated withdrawal symptoms, black out or DTs related to alcohol. He reports that his main concern is that housing as he is renting a one bedroom in which he noted as too small for him and he would like to receive assistance with housing he noted retiring from working as a  June 2018 and reports he experienced the loss of his brother in July 2018 who passed away due to complication of cancer. He continues to state he has been having trouble coping with his loss as he noted being the last surviving member of his immediate family. He denies any specific symptoms of depression but there is concern that patient is minimizing his depressed mood vs concern for low mood due to bereavement. He is in agreement to seek care at the crisis clinic as he is concerned with Viibryd 10mg prescribed by PCP as he no longer wishes to continue taking this medication.

## 2022-04-13 PROCEDURE — 99443: CPT

## 2022-05-11 PROCEDURE — 99443: CPT

## 2022-06-08 PROCEDURE — 99443: CPT

## 2022-07-07 PROCEDURE — 99443: CPT

## 2022-08-01 PROCEDURE — 99443: CPT | Mod: 95

## 2022-10-05 PROCEDURE — 99443: CPT

## 2022-10-21 PROCEDURE — 99443: CPT

## 2022-11-14 PROCEDURE — 99443: CPT

## 2022-11-25 PROCEDURE — 98967 PH1 ASSMT&MGMT NQHP 11-20: CPT

## 2022-12-21 PROCEDURE — 99442: CPT

## 2022-12-27 PROCEDURE — 98968 PH1 ASSMT&MGMT NQHP 21-30: CPT

## 2022-12-30 PROCEDURE — 98968 PH1 ASSMT&MGMT NQHP 21-30: CPT

## 2023-01-03 PROCEDURE — 98968 PH1 ASSMT&MGMT NQHP 21-30: CPT

## 2023-06-06 ENCOUNTER — EMERGENCY (EMERGENCY)
Facility: HOSPITAL | Age: 73
LOS: 1 days | Discharge: PSYCHIATRIC OR SUBSTANCE USE FACILITY | End: 2023-06-06
Attending: STUDENT IN AN ORGANIZED HEALTH CARE EDUCATION/TRAINING PROGRAM
Payer: MEDICARE

## 2023-06-06 VITALS
RESPIRATION RATE: 16 BRPM | WEIGHT: 169.98 LBS | DIASTOLIC BLOOD PRESSURE: 83 MMHG | SYSTOLIC BLOOD PRESSURE: 164 MMHG | HEIGHT: 67 IN | OXYGEN SATURATION: 96 % | TEMPERATURE: 99 F | HEART RATE: 89 BPM

## 2023-06-06 VITALS
SYSTOLIC BLOOD PRESSURE: 136 MMHG | HEART RATE: 74 BPM | OXYGEN SATURATION: 98 % | DIASTOLIC BLOOD PRESSURE: 78 MMHG | RESPIRATION RATE: 16 BRPM | TEMPERATURE: 98 F

## 2023-06-06 PROCEDURE — 73502 X-RAY EXAM HIP UNI 2-3 VIEWS: CPT

## 2023-06-06 PROCEDURE — 99285 EMERGENCY DEPT VISIT HI MDM: CPT | Mod: 25

## 2023-06-06 PROCEDURE — 73564 X-RAY EXAM KNEE 4 OR MORE: CPT | Mod: 26,LT

## 2023-06-06 PROCEDURE — 99284 EMERGENCY DEPT VISIT MOD MDM: CPT

## 2023-06-06 PROCEDURE — 73552 X-RAY EXAM OF FEMUR 2/>: CPT | Mod: 26,LT

## 2023-06-06 PROCEDURE — 70450 CT HEAD/BRAIN W/O DYE: CPT | Mod: MA

## 2023-06-06 PROCEDURE — 70450 CT HEAD/BRAIN W/O DYE: CPT | Mod: 26,MA

## 2023-06-06 PROCEDURE — 73502 X-RAY EXAM HIP UNI 2-3 VIEWS: CPT | Mod: 26,LT

## 2023-06-06 PROCEDURE — 73590 X-RAY EXAM OF LOWER LEG: CPT | Mod: 26,LT

## 2023-06-06 PROCEDURE — 73552 X-RAY EXAM OF FEMUR 2/>: CPT

## 2023-06-06 PROCEDURE — 73564 X-RAY EXAM KNEE 4 OR MORE: CPT

## 2023-06-06 PROCEDURE — 73590 X-RAY EXAM OF LOWER LEG: CPT

## 2023-06-06 RX ORDER — ACETAMINOPHEN 500 MG
975 TABLET ORAL ONCE
Refills: 0 | Status: COMPLETED | OUTPATIENT
Start: 2023-06-06 | End: 2023-06-06

## 2023-06-06 RX ADMIN — Medication 975 MILLIGRAM(S): at 13:25

## 2023-06-06 NOTE — ED ADULT NURSE NOTE - OBJECTIVE STATEMENT
Patient is a 72y axo x3 male presenting to the ED via EMS for complaints of a fall. Patient has a PMH of Schizo, HTN, and multiple falls. Patient is an inpatient @ [facility].  Per PT he fell 2 days ago that resulted in Left Knee Pain which he is following up on today in the ED. PT is currently on Aspirin. Upon assessment PT is sitting up breathing spontaneous and unlabored with no signs respiratory distress. Abdomen is soft, nondistended and nontender. PT has full ROM in bilateral legs, equal strength, and full sensation bilaterally. Note Left knee has a small bruised area. PT is ambulatory with assist and a unsteady gait per EMS. PT reported head injury with fall but denies HA, change in vision, and LOC. Constant observation 1:1 in place. Safety and comfort measures provided, bed locked and in lowest position, side rails up for safety. Call bell within reach. Awaiting MD Reassessment. Patient is a 72y axo x3 male presenting to the ED via EMS for complaints of a fall. Patient has a PMH of Schizo, HTN, and multiple falls. Patient is an inpatient @ [facility].  Per PT he fell 2 days ago that resulted in Left Knee Pain which he is following up on today in the ED. PT is currently on Aspirin. Upon assessment PT is sitting up breathing spontaneous and unlabored with no signs respiratory distress. Abdomen is soft, nondistended and nontender. PT has full ROM in bilateral legs, equal strength, and full sensation bilaterally. Note Left knee has a small bruised area. PT is ambulatory with assist and a unsteady gait per EMS. PT reported head injury with fall but denies HA, change in vision, and LOC. Constant observation 1:1 in place. Safety and comfort measures provided, bed locked and in lowest position, side rails up for safety. Call bell within reach. Awaiting MD Reassessment. Patient is a 72y axo x3 male presenting to the ED via EMS for complaints of a fall. Patient has a PMH of Schizo, HTN, and multiple falls. Patient is an inpatient @ [facility].  Per PT he fell 2 days ago that resulted in Left Knee Pain which he is following up on today in the ED. PT is currently on Aspirin. Upon assessment PT is sitting up breathing spontaneous and unlabored with no signs respiratory distress. Abdomen is soft, nondistended and nontender. PT has full ROM in bilateral legs, equal strength, and full sensation bilaterally. Note Left knee has a small bruised area. PT is ambulatory with assist and a unsteady gait per EMS. PT reported head injury with fall but denies HA, change in vision, and LOC. Constant observation 1:1 in place. Safety and comfort measures provided, bed locked and in lowest position, side rails up for safety. Call bell within reach. Awaiting MD Reassessment.

## 2023-06-06 NOTE — ED ADULT NURSE NOTE - NSFALLHARMRISKINTERV_ED_ALL_ED
Assistance OOB with selected safe patient handling equipment if applicable/Assistance with ambulation/Communicate risk of Fall with Harm to all staff, patient, and family/Monitor gait and stability/Provide visual cue: red socks, yellow wristband, yellow gown, etc/Reinforce activity limits and safety measures with patient and family/Bed in lowest position, wheels locked, appropriate side rails in place/Call bell, personal items and telephone in reach/Instruct patient to call for assistance before getting out of bed/chair/stretcher/Non-slip footwear applied when patient is off stretcher/[place] to call system/Physically safe environment - no spills, clutter or unnecessary equipment/Purposeful Proactive Rounding/Room/bathroom lighting operational, light cord in reach Assistance OOB with selected safe patient handling equipment if applicable/Assistance with ambulation/Communicate risk of Fall with Harm to all staff, patient, and family/Monitor gait and stability/Provide visual cue: red socks, yellow wristband, yellow gown, etc/Reinforce activity limits and safety measures with patient and family/Bed in lowest position, wheels locked, appropriate side rails in place/Call bell, personal items and telephone in reach/Instruct patient to call for assistance before getting out of bed/chair/stretcher/Non-slip footwear applied when patient is off stretcher/[place] to call system/Physically safe environment - no spills, clutter or unnecessary equipment/Purposeful Proactive Rounding/Room/bathroom lighting operational, light cord in reach Assistance OOB with selected safe patient handling equipment if applicable/Assistance with ambulation/Communicate risk of Fall with Harm to all staff, patient, and family/Monitor gait and stability/Provide visual cue: red socks, yellow wristband, yellow gown, etc/Reinforce activity limits and safety measures with patient and family/Bed in lowest position, wheels locked, appropriate side rails in place/Call bell, personal items and telephone in reach/Instruct patient to call for assistance before getting out of bed/chair/stretcher/Non-slip footwear applied when patient is off stretcher/[place] to call system/Physically safe environment - no spills, clutter or unnecessary equipment/Purposeful Proactive Rounding/Room/bathroom lighting operational, light cord in reach

## 2023-06-06 NOTE — ED PROVIDER NOTE - NSDCPRINTRESULTS_ED_ALL_ED
Stress incontinence in female
Patient requests all Lab, Cardiology, and Radiology Results on their Discharge Instructions

## 2023-06-06 NOTE — ED ADULT NURSE NOTE - NS ED NURSE AMBULANCES2
[facility]ulance Service [facility]

## 2023-06-06 NOTE — ED ADULT NURSE REASSESSMENT NOTE - NS ED NURSE REASSESS COMMENT FT1
Report given to [facility] EMS Transport before picking up patient. Report given to [facility] EMS Transport before picking up patient. Report given to [facility] EMS Transport before picking up patient.

## 2023-06-06 NOTE — ED ADULT NURSE NOTE - CAS EDP DISCH TYPE
[facility]

## 2023-06-06 NOTE — ED PROVIDER NOTE - PATIENT PORTAL LINK FT
You can access the FollowMyHealth Patient Portal offered by [facility] by registering at the following website: [email or web address]. By joining [employer]’s FollowMyHealth portal, you will also be able to view your health information using other applications (apps) compatible with our system. You can access the FollowMyHealth Patient Portal offered by [facility] by registering at the following website: [email or web address]. By joining [employer]’s FollowMyHealth portal, you will also be able to view your health information using other applications (apps) compatible with our system. You can access the FollowMyHealth Patient Portal offered by [facility] by registering at the following website: [email or web address]. By joining [employer]’s FollowMyHealth portal, you will also be able to view your health information using other applications (apps) compatible with our system.

## 2023-06-06 NOTE — ED PROVIDER NOTE - OBJECTIVE STATEMENT
72-year-old male with history of schizoaffective disorder currently being treated in inpatient center here for evaluation of left knee pain for 2 days.  Patient states he rolled out of bed at 2 AM and fell onto his left knee also striking his head.  Denies LOC.  Is on baby aspirin daily.  He was able to ambulate afterwards however today pain with ambulating.  He denies any nausea, vomiting, numbness, tingling, paresthesias.  Patient is accompanied by worker from [employer] who states pt is behaving at baseline. 72-year-old male with history of schizoaffective disorder currently being treated in inpatient center here for evaluation of left knee pain for 2 days.  Patient states he rolled out of bed at 2 AM and fell onto his left knee also striking his head.  Denies LOC.  Is on baby aspirin daily.  He was able to ambulate afterwards however today pain with ambulating.  He denies any nausea, vomiting, numbness, tingling, paresthesias.  Patient is accompanied by worker from [employer] who states pt is behaving at baseline. 72-year-old male with history of schizoaffective disorder currently being treated in inpatient center here for evaluation of left knee pain for 2 days.  Patient states he rolled out of bed at 2 AM and fell onto his left knee also striking his head.  Denies LOC.  Is on baby aspirin daily.  He was able to ambulate afterwards however today pain with ambulating.  He denies any nausea, vomiting, numbness, tingling, paresthesias.  Patient is accompanied by worker from [employer] who states pt is behaving at baseline.

## 2023-06-06 NOTE — ED PROVIDER NOTE - PROGRESS NOTE DETAILS
xrays reviewed, no acute fracture appreciated, pt ambulatory with steady gait. -Oliva Chang PA-C xrays reviewed, no acute fractures, Xray read possible avulsion over the achilles however without point ttp, pt ambulatory with steady gait. -Oliva Chang PA-C xrays reviewed, no acute fractures, Xray read possible avulsion over the achilles however without point ttp, no ttp over the pubic body and no need for further imaging, pt ambulatory with steady gait. -Oliva Chang PA-C spoke with covering physician Dr. Miller from [facility], agreeable with d/c, requesting L hip imaging, pt ambulatory and without hip ttp or hip manipulation but will order for out pt workup. -JAM CervantesC spoke with covering physician Dr. Miller from [facility], agreeable with d/c, requesting L hip imaging, pt ambulatory and without hip ttp or hip manipulation but will order for out pt workup. -JAM CervantesC spoke with covering physician Dr. Miller from [facility], agreeable with d/c, requesting L hip imaging, pt ambulatory and without hip ttp or hip manipulation but will order for out pt workup. -JAM CervantesC

## 2023-06-06 NOTE — ED PROVIDER NOTE - NSFOLLOWUPINSTRUCTIONS_ED_ALL_ED_FT
- stay hydrated.   -take all home medications as prescribed  - take tylenol 975mg  every 6 hours as needed for pain-take with meals.  -rest, keep Left leg elevated and apply ice as needed for pain   - follow up with your primary care provider in 1-2 days.  - follow up with orthopedics this week, you can call number above to make an appointment  - return if symptoms worsen, weakness, numbness, tingling and all other concerns.

## 2023-06-06 NOTE — ED PROVIDER NOTE - NSFOLLOWUPCLINICSTOKEN_GEN_ALL_ED_FT
272188:1-3 Days|| ||00\01||False; [id number]:1-3 Days|| ||00\01||False; 271745:1-3 Days|| ||00\01||False;

## 2023-06-06 NOTE — ED ADULT TRIAGE NOTE - NS ED NURSE AMBULANCES
[facility] Ambulance Service [facility] Service [facility]

## 2023-06-06 NOTE — ED PROVIDER NOTE - ATTENDING APP SHARED VISIT CONTRIBUTION OF CARE
I, Prince Baker, performed a history and physical exam of the patient and discussed their management with the resident and/or advanced care provider. I reviewed the resident and/or advanced care provider's note and agree with the documented findings and plan of care. I was present and available for all procedures.    72-year-old male with history of schizoaffective disorder currently being treated in inpatient center here for evaluation of left knee pain for 2 days.  Patient states he rolled out of bed at 2 AM and fell onto his left knee also striking his head.  Denies LOC.  Is on baby aspirin daily.  He was able to ambulate afterwards however today pain with ambulating.  He denies any nausea, vomiting, numbness, tingling, paresthesias.  Patient is accompanied by worker from [employer] who states pt is behaving at baseline.    Well appearing and in NAD, head normal appearing atraumatic, trachea midline, no respiratory distress, lungs cta bilaterally, rrr no murmurs, soft NT ND abdomen, no visible extremity deformities, Alert and oriented, non focal neuro exam, skin warm and dry, normal affect and mood, no leg swelling, calf ttp or jvd, No CTL spine midline tenderness palpation chest wall without tenderness pelvis stable no greater trochanter tenderness palpation moving all extremities neurovascular intact distally throughout    Concern for fall otherwise complained of left knee pain with normal exam also reports striking his head but no LOC will obtain screening CT head x-ray pain medication as needed anticipate discharge back to facility discussed with patient agreeable with plan unlikely ACS PE pneumothorax dissection AAA pneumonia I, Prince Baker, performed a history and physical exam of the patient and discussed their management with the resident and/or advanced care provider. I reviewed the resident and/or advanced care provider's note and agree with the documented findings and plan of care. I was present and available for all procedures.    72-year-old male with history of schizoaffective disorder currently being treated in inpatient center here for evaluation of left knee pain for 2 days.  Patient states he rolled out of bed at 2 AM and fell onto his left knee also striking his head.  Denies LOC.  Is on baby aspirin daily.  He was able to ambulate afterwards however today pain with ambulating.  He denies any nausea, vomiting, numbness, tingling, paresthesias.  Patient is accompanied by worker from [employer] who states pt is behaving at baseline.    Well appearing and in NAD, head normal appearing atraumatic, trachea midline, no respiratory distress, lungs cta bilaterally, rrr no murmurs, soft NT ND abdomen, no visible extremity deformities, Alert and oriented, non focal neuro exam, skin warm and dry, normal affect and mood, no leg swelling, calf ttp or jvd, No CTL spine midline tenderness palpation chest wall without tenderness pelvis stable no greater trochanter tenderness palpation moving all extremities neurovascular intact distally throughout    Concern for fall otherwise complained of left knee pain with normal exam also reports striking his head but no LOC will obtain screening CT head x-ray pain medication as needed anticipate discharge back to facility discussed with patient agreeable with plan unlikely ACS PE pneumothorax dissection AAA pneumonia I, Prince Baker, performed a history and physical exam of the patient and discussed their management with the resident and/or advanced care provider. I reviewed the resident and/or advanced care provider's note and agree with the documented findings and plan of care. I was present and available for all procedures.    72-year-old male with history of schizoaffective disorder currently being treated in inpatient center here for evaluation of left knee pain for 2 days.  Patient states he rolled out of bed at 2 AM and fell onto his left knee also striking his head.  Denies LOC.  Is on baby aspirin daily.  He was able to ambulate afterwards however today pain with ambulating.  He denies any nausea, vomiting, numbness, tingling, paresthesias.  Patient is accompanied by worker from [employer] who states pt is behaving at baseline.    Well appearing and in NAD, head normal appearing atraumatic, trachea midline, no respiratory distress, lungs cta bilaterally, rrr no murmurs, soft NT ND abdomen, no visible extremity deformities, Alert and oriented, non focal neuro exam, skin warm and dry, normal affect and mood, no leg swelling, calf ttp or jvd, No CTL spine midline tenderness palpation chest wall without tenderness pelvis stable no greater trochanter tenderness palpation moving all extremities neurovascular intact distally throughout    Concern for fall otherwise complained of left knee pain with normal exam also reports striking his head but no LOC will obtain screening CT head x-ray pain medication as needed anticipate discharge back to facility discussed with patient agreeable with plan unlikely ACS PE pneumothorax dissection AAA pneumonia

## 2023-06-06 NOTE — ED PROVIDER NOTE - NSFOLLOWUPCLINICS_GEN_ALL_ED_FT
[facility] [place]  Orthopedics  .  [place]   Phone: [phone]  Fax:   Follow Up Time: 1-3 Days     [facility] [place]  Orthopedics  .  [place]   Phone: [phone]  Fax:   Follow Up Time: 1-3 Days     [facility] [place]  Orthopedics  .  [place]   Phone: [phone]  Fax:   Follow Up Time: 1-3 Days

## 2023-06-06 NOTE — ED PROVIDER NOTE - PHYSICAL EXAMINATION
A&Ox3, NAD, well appearing  NCAT. PERRL, no vertebral ttp of the c/t/l spine, c-spine with FAROM.   Lungs CTAB. No w/r/r, no ttp of the sternum or thorax,   Cardiac RRR,  Abd soft, NT/ND, +BS, no rebound or guarding.   Extremities: L knee with mild knee effusionon d overlaying ecchymosis, Knee with near FAROM, strength 5/5, no bony ttp.  cap refill <2, pulses in distal extremities 4+, no edema. compartments soft on BL LE  Skin without rash, ecchymosis or abrasions  + resting tremor of the RUE which is pts baseline.

## 2023-09-14 ENCOUNTER — INPATIENT (INPATIENT)
Facility: HOSPITAL | Age: 73
LOS: 10 days | Discharge: NURSING, REHAB OR LONG-TERM CARE | End: 2023-09-25
Attending: HOSPITALIST | Admitting: HOSPITALIST
Payer: MEDICARE

## 2023-09-14 VITALS
RESPIRATION RATE: 16 BRPM | HEART RATE: 83 BPM | TEMPERATURE: 99 F | SYSTOLIC BLOOD PRESSURE: 126 MMHG | DIASTOLIC BLOOD PRESSURE: 65 MMHG | OXYGEN SATURATION: 99 %

## 2023-09-14 PROCEDURE — 99285 EMERGENCY DEPT VISIT HI MDM: CPT

## 2023-09-14 RX ORDER — ACETAMINOPHEN 500 MG
975 TABLET ORAL ONCE
Refills: 0 | Status: COMPLETED | OUTPATIENT
Start: 2023-09-14 | End: 2023-09-14

## 2023-09-14 RX ORDER — IBUPROFEN 200 MG
600 TABLET ORAL ONCE
Refills: 0 | Status: COMPLETED | OUTPATIENT
Start: 2023-09-14 | End: 2023-09-14

## 2023-09-14 RX ORDER — KETOROLAC TROMETHAMINE 30 MG/ML
15 SYRINGE (ML) INJECTION ONCE
Refills: 0 | Status: DISCONTINUED | OUTPATIENT
Start: 2023-09-14 | End: 2023-09-14

## 2023-09-14 RX ORDER — ACETAMINOPHEN 500 MG
1000 TABLET ORAL ONCE
Refills: 0 | Status: DISCONTINUED | OUTPATIENT
Start: 2023-09-14 | End: 2023-09-14

## 2023-09-14 NOTE — ED ADULT NURSE NOTE - CHIEF COMPLAINT QUOTE
alert oriented c/o right knee pain fell yesterday was seen here in [place] for right hip pain and discharged   was sent back from [facility] for right knee injury  unable to walk after fall

## 2023-09-14 NOTE — ED ADULT TRIAGE NOTE - CHIEF COMPLAINT QUOTE
alert oriented c/o right knee pain fell yesterday was seen here in [facility] for right hip pain and discharged   was sent back from [facility] for right knee injury  unable to walk after fall

## 2023-09-14 NOTE — ED ADULT NURSE NOTE - OBJECTIVE STATEMENT
Pt received in 12B. Pt is a&ox3, resp even and unlabored, no acute distress in appearance. Pt presents with right knee pain after a fall yesterday. Pt reports he was wearing socks when he slipped and landed on his right knee, he endorses difficulty ambulating after the fall, has been taking ibuprofen for the pain but it hasn't been helping. Denies head strike. Pt reports that he was here this morning for the fall and he was called back for an unknown reason. Pt has pain on palpation, no deformity noted. Bed in lowest position and both side rails up, in view of nurses station. Will maintain safety and continue to monitor. Pt received in 12B. Pt is a&ox3, resp even and unlabored, no acute distress in appearance. Pt presents with right knee pain after a fall yesterday. Pt reports he was wearing socks when he slipped and landed on his right knee, he endorses difficulty ambulating after the fall, has been taking ibuprofen for the pain but it hasn't been helping. Denies head strike. Pt reports that he was evaluated at [facility] this morning for the fall and he was called back for an unknown reason, but came to [facility] for further evaluation. Pt has pain on palpation, no deformity noted. Bed in lowest position and both side rails up, in view of nurses station. Will maintain safety and continue to monitor.

## 2023-09-14 NOTE — ED ADULT NURSE NOTE - NSFALLRISKINTERV_ED_ALL_ED
Assistance OOB with selected safe patient handling equipment if applicable/Assistance with ambulation/Communicate fall risk and risk factors to all staff, patient, and family/Monitor gait and stability/Provide visual cue: yellow wristband, yellow gown, etc/Reinforce activity limits and safety measures with patient and family/Call bell, personal items and telephone in reach/Instruct patient to call for assistance before getting out of bed/chair/stretcher/Non-slip footwear applied when patient is off stretcher/[place] to call system/Physically safe environment - no spills, clutter or unnecessary equipment/Purposeful Proactive Rounding/Room/bathroom lighting operational, light cord in reach

## 2023-09-15 DIAGNOSIS — F25.9 SCHIZOAFFECTIVE DISORDER, UNSPECIFIED: ICD-10-CM

## 2023-09-15 DIAGNOSIS — R30.0 DYSURIA: ICD-10-CM

## 2023-09-15 DIAGNOSIS — Z29.9 ENCOUNTER FOR PROPHYLACTIC MEASURES, UNSPECIFIED: ICD-10-CM

## 2023-09-15 DIAGNOSIS — Z71.89 OTHER SPECIFIED COUNSELING: ICD-10-CM

## 2023-09-15 DIAGNOSIS — I10 ESSENTIAL (PRIMARY) HYPERTENSION: ICD-10-CM

## 2023-09-15 DIAGNOSIS — M25.561 PAIN IN RIGHT KNEE: ICD-10-CM

## 2023-09-15 DIAGNOSIS — Z98.890 OTHER SPECIFIED POSTPROCEDURAL STATES: Chronic | ICD-10-CM

## 2023-09-15 DIAGNOSIS — N39.0 URINARY TRACT INFECTION, SITE NOT SPECIFIED: ICD-10-CM

## 2023-09-15 LAB
ALBUMIN SERPL ELPH-MCNC: 3.9 G/DL — SIGNIFICANT CHANGE UP (ref 3.3–5)
ALP SERPL-CCNC: 112 U/L — SIGNIFICANT CHANGE UP (ref 40–120)
ALT FLD-CCNC: 13 U/L — SIGNIFICANT CHANGE UP (ref 4–41)
ANION GAP SERPL CALC-SCNC: 14 MMOL/L — SIGNIFICANT CHANGE UP (ref 7–14)
ANION GAP SERPL CALC-SCNC: 15 MMOL/L — HIGH (ref 7–14)
ANISOCYTOSIS BLD QL: SLIGHT — SIGNIFICANT CHANGE UP
APPEARANCE UR: CLEAR — SIGNIFICANT CHANGE UP
AST SERPL-CCNC: 23 U/L — SIGNIFICANT CHANGE UP (ref 4–40)
BASOPHILS # BLD AUTO: 0 K/UL — SIGNIFICANT CHANGE UP (ref 0–0.2)
BASOPHILS # BLD AUTO: 0.03 K/UL — SIGNIFICANT CHANGE UP (ref 0–0.2)
BASOPHILS NFR BLD AUTO: 0 % — SIGNIFICANT CHANGE UP (ref 0–2)
BASOPHILS NFR BLD AUTO: 0.2 % — SIGNIFICANT CHANGE UP (ref 0–2)
BILIRUB SERPL-MCNC: 1.1 MG/DL — SIGNIFICANT CHANGE UP (ref 0.2–1.2)
BILIRUB UR-MCNC: NEGATIVE — SIGNIFICANT CHANGE UP
BUN SERPL-MCNC: 30 MG/DL — HIGH (ref 7–23)
BUN SERPL-MCNC: 31 MG/DL — HIGH (ref 7–23)
CALCIUM SERPL-MCNC: 8.8 MG/DL — SIGNIFICANT CHANGE UP (ref 8.4–10.5)
CALCIUM SERPL-MCNC: 9.1 MG/DL — SIGNIFICANT CHANGE UP (ref 8.4–10.5)
CHLORIDE SERPL-SCNC: 101 MMOL/L — SIGNIFICANT CHANGE UP (ref 98–107)
CHLORIDE SERPL-SCNC: 101 MMOL/L — SIGNIFICANT CHANGE UP (ref 98–107)
CO2 SERPL-SCNC: 23 MMOL/L — SIGNIFICANT CHANGE UP (ref 22–31)
CO2 SERPL-SCNC: 26 MMOL/L — SIGNIFICANT CHANGE UP (ref 22–31)
COLOR SPEC: SIGNIFICANT CHANGE UP
CREAT SERPL-MCNC: 0.75 MG/DL — SIGNIFICANT CHANGE UP (ref 0.5–1.3)
CREAT SERPL-MCNC: 0.84 MG/DL — SIGNIFICANT CHANGE UP (ref 0.5–1.3)
DIFF PNL FLD: ABNORMAL
EGFR: 92 ML/MIN/1.73M2 — SIGNIFICANT CHANGE UP
EGFR: 95 ML/MIN/1.73M2 — SIGNIFICANT CHANGE UP
EOSINOPHIL # BLD AUTO: 0 K/UL — SIGNIFICANT CHANGE UP (ref 0–0.5)
EOSINOPHIL # BLD AUTO: 0.07 K/UL — SIGNIFICANT CHANGE UP (ref 0–0.5)
EOSINOPHIL NFR BLD AUTO: 0 % — SIGNIFICANT CHANGE UP (ref 0–6)
EOSINOPHIL NFR BLD AUTO: 0.5 % — SIGNIFICANT CHANGE UP (ref 0–6)
GIANT PLATELETS BLD QL SMEAR: PRESENT — SIGNIFICANT CHANGE UP
GLUCOSE SERPL-MCNC: 112 MG/DL — HIGH (ref 70–99)
GLUCOSE SERPL-MCNC: 85 MG/DL — SIGNIFICANT CHANGE UP (ref 70–99)
GLUCOSE UR QL: NEGATIVE MG/DL — SIGNIFICANT CHANGE UP
HCT VFR BLD CALC: 31.8 % — LOW (ref 39–50)
HCT VFR BLD CALC: 32.9 % — LOW (ref 39–50)
HGB BLD-MCNC: 10.3 G/DL — LOW (ref 13–17)
HGB BLD-MCNC: 10.8 G/DL — LOW (ref 13–17)
IANC: 10.49 K/UL — HIGH (ref 1.8–7.4)
IANC: 14.8 K/UL — HIGH (ref 1.8–7.4)
IMM GRANULOCYTES NFR BLD AUTO: 0.4 % — SIGNIFICANT CHANGE UP (ref 0–0.9)
KETONES UR-MCNC: ABNORMAL MG/DL
LEUKOCYTE ESTERASE UR-ACNC: ABNORMAL
LYMPHOCYTES # BLD AUTO: 1.05 K/UL — SIGNIFICANT CHANGE UP (ref 1–3.3)
LYMPHOCYTES # BLD AUTO: 1.12 K/UL — SIGNIFICANT CHANGE UP (ref 1–3.3)
LYMPHOCYTES # BLD AUTO: 6.1 % — LOW (ref 13–44)
LYMPHOCYTES # BLD AUTO: 8.5 % — LOW (ref 13–44)
MAGNESIUM SERPL-MCNC: 1.9 MG/DL — SIGNIFICANT CHANGE UP (ref 1.6–2.6)
MANUAL SMEAR VERIFICATION: SIGNIFICANT CHANGE UP
MCHC RBC-ENTMCNC: 29 PG — SIGNIFICANT CHANGE UP (ref 27–34)
MCHC RBC-ENTMCNC: 29.5 PG — SIGNIFICANT CHANGE UP (ref 27–34)
MCHC RBC-ENTMCNC: 32.4 GM/DL — SIGNIFICANT CHANGE UP (ref 32–36)
MCHC RBC-ENTMCNC: 32.8 GM/DL — SIGNIFICANT CHANGE UP (ref 32–36)
MCV RBC AUTO: 89.6 FL — SIGNIFICANT CHANGE UP (ref 80–100)
MCV RBC AUTO: 89.9 FL — SIGNIFICANT CHANGE UP (ref 80–100)
MONOCYTES # BLD AUTO: 1.35 K/UL — HIGH (ref 0–0.9)
MONOCYTES # BLD AUTO: 1.8 K/UL — HIGH (ref 0–0.9)
MONOCYTES NFR BLD AUTO: 10.3 % — SIGNIFICANT CHANGE UP (ref 2–14)
MONOCYTES NFR BLD AUTO: 10.4 % — SIGNIFICANT CHANGE UP (ref 2–14)
NEUTROPHILS # BLD AUTO: 10.49 K/UL — HIGH (ref 1.8–7.4)
NEUTROPHILS # BLD AUTO: 14.27 K/UL — HIGH (ref 1.8–7.4)
NEUTROPHILS NFR BLD AUTO: 80.1 % — HIGH (ref 43–77)
NEUTROPHILS NFR BLD AUTO: 82.6 % — HIGH (ref 43–77)
NITRITE UR-MCNC: NEGATIVE — SIGNIFICANT CHANGE UP
NRBC # BLD: 0 /100 WBCS — SIGNIFICANT CHANGE UP (ref 0–0)
NRBC # FLD: 0 K/UL — SIGNIFICANT CHANGE UP (ref 0–0)
PH UR: 6 — SIGNIFICANT CHANGE UP (ref 5–8)
PHOSPHATE SERPL-MCNC: 2.7 MG/DL — SIGNIFICANT CHANGE UP (ref 2.5–4.5)
PLAT MORPH BLD: NORMAL — SIGNIFICANT CHANGE UP
PLATELET # BLD AUTO: 194 K/UL — SIGNIFICANT CHANGE UP (ref 150–400)
PLATELET # BLD AUTO: 221 K/UL — SIGNIFICANT CHANGE UP (ref 150–400)
PLATELET COUNT - ESTIMATE: NORMAL — SIGNIFICANT CHANGE UP
POIKILOCYTOSIS BLD QL AUTO: SLIGHT — SIGNIFICANT CHANGE UP
POLYCHROMASIA BLD QL SMEAR: SLIGHT — SIGNIFICANT CHANGE UP
POTASSIUM SERPL-MCNC: 3.8 MMOL/L — SIGNIFICANT CHANGE UP (ref 3.5–5.3)
POTASSIUM SERPL-MCNC: 4.3 MMOL/L — SIGNIFICANT CHANGE UP (ref 3.5–5.3)
POTASSIUM SERPL-SCNC: 3.8 MMOL/L — SIGNIFICANT CHANGE UP (ref 3.5–5.3)
POTASSIUM SERPL-SCNC: 4.3 MMOL/L — SIGNIFICANT CHANGE UP (ref 3.5–5.3)
PROT SERPL-MCNC: 6.7 G/DL — SIGNIFICANT CHANGE UP (ref 6–8.3)
PROT UR-MCNC: 30 MG/DL
RBC # BLD: 3.55 M/UL — LOW (ref 4.2–5.8)
RBC # BLD: 3.66 M/UL — LOW (ref 4.2–5.8)
RBC # FLD: 13.3 % — SIGNIFICANT CHANGE UP (ref 10.3–14.5)
RBC # FLD: 13.3 % — SIGNIFICANT CHANGE UP (ref 10.3–14.5)
RBC BLD AUTO: ABNORMAL
SODIUM SERPL-SCNC: 139 MMOL/L — SIGNIFICANT CHANGE UP (ref 135–145)
SODIUM SERPL-SCNC: 141 MMOL/L — SIGNIFICANT CHANGE UP (ref 135–145)
SP GR SPEC: 1.05 — HIGH (ref 1–1.03)
UROBILINOGEN FLD QL: 1 MG/DL — SIGNIFICANT CHANGE UP (ref 0.2–1)
VARIANT LYMPHS # BLD: 0.9 % — SIGNIFICANT CHANGE UP (ref 0–6)
WBC # BLD: 13.11 K/UL — HIGH (ref 3.8–10.5)
WBC # BLD: 17.27 K/UL — HIGH (ref 3.8–10.5)
WBC # FLD AUTO: 13.11 K/UL — HIGH (ref 3.8–10.5)
WBC # FLD AUTO: 17.27 K/UL — HIGH (ref 3.8–10.5)

## 2023-09-15 PROCEDURE — 93010 ELECTROCARDIOGRAM REPORT: CPT

## 2023-09-15 PROCEDURE — 99223 1ST HOSP IP/OBS HIGH 75: CPT

## 2023-09-15 PROCEDURE — 73564 X-RAY EXAM KNEE 4 OR MORE: CPT | Mod: 26,RT

## 2023-09-15 PROCEDURE — 73701 CT LOWER EXTREMITY W/DYE: CPT | Mod: 26,RT,MA

## 2023-09-15 RX ORDER — DIAZEPAM 5 MG
5 TABLET ORAL DAILY
Refills: 0 | Status: DISCONTINUED | OUTPATIENT
Start: 2023-09-15 | End: 2023-09-22

## 2023-09-15 RX ORDER — LOSARTAN POTASSIUM 100 MG/1
1 TABLET, FILM COATED ORAL
Refills: 0 | DISCHARGE

## 2023-09-15 RX ORDER — SENNA PLUS 8.6 MG/1
2 TABLET ORAL AT BEDTIME
Refills: 0 | Status: DISCONTINUED | OUTPATIENT
Start: 2023-09-15 | End: 2023-09-25

## 2023-09-15 RX ORDER — TAMSULOSIN HYDROCHLORIDE 0.4 MG/1
0.4 CAPSULE ORAL AT BEDTIME
Refills: 0 | Status: DISCONTINUED | OUTPATIENT
Start: 2023-09-15 | End: 2023-09-25

## 2023-09-15 RX ORDER — CEFTRIAXONE 500 MG/1
1000 INJECTION, POWDER, FOR SOLUTION INTRAMUSCULAR; INTRAVENOUS EVERY 24 HOURS
Refills: 0 | Status: DISCONTINUED | OUTPATIENT
Start: 2023-09-15 | End: 2023-09-16

## 2023-09-15 RX ORDER — OLANZAPINE 15 MG/1
1 TABLET, FILM COATED ORAL
Refills: 0 | DISCHARGE

## 2023-09-15 RX ORDER — SERTRALINE 25 MG/1
2.5 TABLET, FILM COATED ORAL
Refills: 0 | DISCHARGE

## 2023-09-15 RX ORDER — ASPIRIN/CALCIUM CARB/MAGNESIUM 324 MG
1 TABLET ORAL
Refills: 0 | DISCHARGE

## 2023-09-15 RX ORDER — CHOLECALCIFEROL (VITAMIN D3) 125 MCG
1 CAPSULE ORAL
Refills: 0 | DISCHARGE

## 2023-09-15 RX ORDER — LANOLIN ALCOHOL/MO/W.PET/CERES
1 CREAM (GRAM) TOPICAL
Refills: 0 | DISCHARGE

## 2023-09-15 RX ORDER — KETOCONAZOLE 20 MG/G
1 AEROSOL, FOAM TOPICAL
Refills: 0 | DISCHARGE

## 2023-09-15 RX ORDER — HEPARIN SODIUM 5000 [USP'U]/ML
5000 INJECTION INTRAVENOUS; SUBCUTANEOUS EVERY 8 HOURS
Refills: 0 | Status: DISCONTINUED | OUTPATIENT
Start: 2023-09-15 | End: 2023-09-25

## 2023-09-15 RX ORDER — ACETAMINOPHEN 500 MG
650 TABLET ORAL EVERY 6 HOURS
Refills: 0 | Status: DISCONTINUED | OUTPATIENT
Start: 2023-09-15 | End: 2023-09-25

## 2023-09-15 RX ORDER — PANTOPRAZOLE SODIUM 20 MG/1
1 TABLET, DELAYED RELEASE ORAL
Refills: 0 | DISCHARGE

## 2023-09-15 RX ORDER — SENNA PLUS 8.6 MG/1
1 TABLET ORAL
Refills: 0 | DISCHARGE

## 2023-09-15 RX ORDER — LIDOCAINE 4 G/100G
1 CREAM TOPICAL EVERY 24 HOURS
Refills: 0 | Status: DISCONTINUED | OUTPATIENT
Start: 2023-09-15 | End: 2023-09-25

## 2023-09-15 RX ORDER — ASPIRIN/CALCIUM CARB/MAGNESIUM 324 MG
81 TABLET ORAL DAILY
Refills: 0 | Status: DISCONTINUED | OUTPATIENT
Start: 2023-09-15 | End: 2023-09-17

## 2023-09-15 RX ORDER — LOSARTAN POTASSIUM 100 MG/1
100 TABLET, FILM COATED ORAL DAILY
Refills: 0 | Status: DISCONTINUED | OUTPATIENT
Start: 2023-09-15 | End: 2023-09-25

## 2023-09-15 RX ORDER — CHOLECALCIFEROL (VITAMIN D3) 125 MCG
5000 CAPSULE ORAL DAILY
Refills: 0 | Status: DISCONTINUED | OUTPATIENT
Start: 2023-09-15 | End: 2023-09-25

## 2023-09-15 RX ORDER — DOCUSATE SODIUM 100 MG
2 CAPSULE ORAL
Refills: 0 | DISCHARGE

## 2023-09-15 RX ORDER — OLANZAPINE 15 MG/1
10 TABLET, FILM COATED ORAL DAILY
Refills: 0 | Status: DISCONTINUED | OUTPATIENT
Start: 2023-09-15 | End: 2023-09-25

## 2023-09-15 RX ORDER — NYSTATIN CREAM 100000 [USP'U]/G
1 CREAM TOPICAL
Refills: 0 | Status: DISCONTINUED | OUTPATIENT
Start: 2023-09-15 | End: 2023-09-25

## 2023-09-15 RX ORDER — TAMSULOSIN HYDROCHLORIDE 0.4 MG/1
1 CAPSULE ORAL
Refills: 0 | DISCHARGE

## 2023-09-15 RX ORDER — DIAZEPAM 5 MG
1 TABLET ORAL
Refills: 0 | DISCHARGE

## 2023-09-15 RX ORDER — LANOLIN ALCOHOL/MO/W.PET/CERES
3 CREAM (GRAM) TOPICAL AT BEDTIME
Refills: 0 | Status: DISCONTINUED | OUTPATIENT
Start: 2023-09-15 | End: 2023-09-25

## 2023-09-15 RX ORDER — PANTOPRAZOLE SODIUM 20 MG/1
40 TABLET, DELAYED RELEASE ORAL
Refills: 0 | Status: DISCONTINUED | OUTPATIENT
Start: 2023-09-15 | End: 2023-09-25

## 2023-09-15 RX ORDER — SERTRALINE 25 MG/1
250 TABLET, FILM COATED ORAL DAILY
Refills: 0 | Status: DISCONTINUED | OUTPATIENT
Start: 2023-09-15 | End: 2023-09-25

## 2023-09-15 RX ADMIN — Medication 975 MILLIGRAM(S): at 01:16

## 2023-09-15 RX ADMIN — HEPARIN SODIUM 5000 UNIT(S): 5000 INJECTION INTRAVENOUS; SUBCUTANEOUS at 22:08

## 2023-09-15 RX ADMIN — Medication 81 MILLIGRAM(S): at 11:33

## 2023-09-15 RX ADMIN — HEPARIN SODIUM 5000 UNIT(S): 5000 INJECTION INTRAVENOUS; SUBCUTANEOUS at 13:16

## 2023-09-15 RX ADMIN — Medication 600 MILLIGRAM(S): at 01:15

## 2023-09-15 RX ADMIN — OLANZAPINE 10 MILLIGRAM(S): 15 TABLET, FILM COATED ORAL at 11:34

## 2023-09-15 RX ADMIN — LIDOCAINE 1 PATCH: 4 CREAM TOPICAL at 18:38

## 2023-09-15 RX ADMIN — Medication 5000 UNIT(S): at 13:17

## 2023-09-15 RX ADMIN — Medication 650 MILLIGRAM(S): at 22:38

## 2023-09-15 RX ADMIN — Medication 1 TABLET(S): at 11:34

## 2023-09-15 RX ADMIN — NYSTATIN CREAM 1 APPLICATION(S): 100000 CREAM TOPICAL at 18:37

## 2023-09-15 RX ADMIN — CEFTRIAXONE 100 MILLIGRAM(S): 500 INJECTION, POWDER, FOR SOLUTION INTRAMUSCULAR; INTRAVENOUS at 07:43

## 2023-09-15 RX ADMIN — PANTOPRAZOLE SODIUM 40 MILLIGRAM(S): 20 TABLET, DELAYED RELEASE ORAL at 07:46

## 2023-09-15 RX ADMIN — TAMSULOSIN HYDROCHLORIDE 0.4 MILLIGRAM(S): 0.4 CAPSULE ORAL at 22:08

## 2023-09-15 RX ADMIN — SERTRALINE 250 MILLIGRAM(S): 25 TABLET, FILM COATED ORAL at 11:34

## 2023-09-15 RX ADMIN — LIDOCAINE 1 PATCH: 4 CREAM TOPICAL at 12:34

## 2023-09-15 RX ADMIN — Medication 650 MILLIGRAM(S): at 22:08

## 2023-09-15 RX ADMIN — Medication 3 MILLIGRAM(S): at 22:08

## 2023-09-15 NOTE — PROGRESS NOTE ADULT - PROBLEM SELECTOR PLAN 5
Pt unsure of home medications  - [facility] med rec team emailed, f/u and complete med rec

## 2023-09-15 NOTE — PROGRESS NOTE ADULT - SUBJECTIVE AND OBJECTIVE BOX
Patient is a 73y old  Male who presents with a chief complaint of R knee pain, inability to ambulate (15 Sep 2023 06:24)      SUBJECTIVE / OVERNIGHT EVENTS: Pt seen and examined at 10:55am, no overnight events, pt reports rt knee pain, says that he is tired, refusing MRI says he is claustrophobic, offered him MRI with sedation but still declines it. No other new issues reported.    MEDICATIONS  (STANDING):  aspirin  chewable 81 milliGRAM(s) Oral daily  cefTRIAXone   IVPB 1000 milliGRAM(s) IV Intermittent every 24 hours  cholecalciferol 5000 Unit(s) Oral daily  heparin   Injectable 5000 Unit(s) SubCutaneous every 8 hours  lidocaine   4% Patch 1 Patch Transdermal every 24 hours  losartan 100 milliGRAM(s) Oral daily  multivitamin 1 Tablet(s) Oral daily  nystatin Powder 1 Application(s) Topical two times a day  OLANZapine 10 milliGRAM(s) Oral daily  pantoprazole    Tablet 40 milliGRAM(s) Oral before breakfast  sertraline 250 milliGRAM(s) Oral daily  tamsulosin 0.4 milliGRAM(s) Oral at bedtime    MEDICATIONS  (PRN):  acetaminophen     Tablet .. 650 milliGRAM(s) Oral every 6 hours PRN Temp greater or equal to 38C (100.4F), Mild Pain (1 - 3)  aluminum hydroxide/magnesium hydroxide/simethicone Suspension 30 milliLiter(s) Oral every 4 hours PRN Dyspepsia  bisacodyl 5 milliGRAM(s) Oral every 12 hours PRN Constipation  diazepam    Tablet 5 milliGRAM(s) Oral daily PRN for moderate to severe anxiety  melatonin 3 milliGRAM(s) Oral at bedtime PRN Insomnia  senna 2 Tablet(s) Oral at bedtime PRN Constipation      Vital Signs Last 24 Hrs  T(C): 36.8 (15 Sep 2023 09:09), Max: 37.2 (14 Sep 2023 22:11)  T(F): 98.2 (15 Sep 2023 09:09), Max: 98.9 (14 Sep 2023 22:11)  HR: 73 (15 Sep 2023 09:09) (73 - 87)  BP: 134/66 (15 Sep 2023 09:09) (126/65 - 141/69)  BP(mean): --  RR: 18 (15 Sep 2023 09:09) (16 - 18)  SpO2: 99% (15 Sep 2023 09:09) (95% - 99%)    Parameters below as of 15 Sep 2023 09:09  Patient On (Oxygen Delivery Method): room air      CAPILLARY BLOOD GLUCOSE        I&O's Summary    14 Sep 2023 07:01  -  15 Sep 2023 07:00  --------------------------------------------------------  IN: 0 mL / OUT: 400 mL / NET: -400 mL        PHYSICAL EXAM:  GENERAL: NAD  CHEST/LUNG: Clear to auscultation bilaterally; No wheeze  HEART: Regular rate and rhythm  ABDOMEN: Soft, Nontender, Nondistended  EXTREMITIES: b/l UE baseline tremors,  Rt knee medial side with minimal ecymosis, no LE edema  PSYCH: Calm  NEUROLOGY: AAOx3      LABS:                        10.3   13.11 )-----------( 194      ( 15 Sep 2023 05:50 )             31.8     09-15    139  |  101  |  30<H>  ----------------------------<  85  3.8   |  23  |  0.75    Ca    8.8      15 Sep 2023 05:50  Phos  2.7     09-15  Mg     1.90     09-15    TPro  6.7  /  Alb  3.9  /  TBili  1.1  /  DBili  x   /  AST  23  /  ALT  13  /  AlkPhos  112  09-15      CARDIAC MARKERS ( 15 Sep 2023 05:50 )  x     / x     / 241 U/L / x     / x          Urinalysis Basic - ( 15 Sep 2023 05:50 )    Color: x / Appearance: x / SG: x / pH: x  Gluc: 85 mg/dL / Ketone: x  / Bili: x / Urobili: x   Blood: x / Protein: x / Nitrite: x   Leuk Esterase: x / RBC: x / WBC x   Sq Epi: x / Non Sq Epi: x / Bacteria: x        RADIOLOGY & ADDITIONAL TESTS:    Imaging Personally Reviewed:    Consultant(s) Notes Reviewed:      Care Discussed with Consultants/Other Providers:

## 2023-09-15 NOTE — PATIENT PROFILE ADULT - FALL HARM RISK - HARM RISK INTERVENTIONS

## 2023-09-15 NOTE — PHYSICAL THERAPY INITIAL EVALUATION ADULT - PERTINENT HX OF CURRENT PROBLEM, REHAB EVAL
73 year-old male sent to the ED for Right knee pain and inability to ambulate, with CT and XR imaging without acute fractures or dislocations, admitted for further management and PT assessment

## 2023-09-15 NOTE — ED PROVIDER NOTE - PHYSICAL EXAMINATION
Gen: NAD  HEENT: EOMI, no nasal discharge, mucous membranes moist  CV: RRR, +S1/S2, no M/R/G, 2+ radial pulses b/l  Resp: CTAB, no W/R/R, no accessory muscle use, no increased work of breathing  GI: Abdomen soft non-distended, NTTP  MSK: +TTP over Rt knee medial and lateral joint lines. with ecchymosis medially and pain with full flexion and extension. No hip tenderness to palpation. Pelvis stable/no ttp. Peripheral pulses intact. Sensation intact.  Neuro: A&Ox4, following commands, moving all four extremities spontaneously. Baseline tremor b/l upper extremities  Psych: No SI/HI

## 2023-09-15 NOTE — H&P ADULT - PROBLEM SELECTOR PLAN 3
Pt with history of schizoaffective disorder, on Haldol IM injections  - cont Olanzapine  - cont Zoloft  - cont Diazepam PRN

## 2023-09-15 NOTE — PROGRESS NOTE ADULT - PROBLEM SELECTOR PLAN 2
Pt with dysuria (burning)and hematuria for the past few days, also with intertrigo around groin and testes area. No flank pain but does endorse weight loss for the past year. Hematuria likely due to acute UTI but also possibly malignancy given weight loss and smoking history.   - Ceftriaxone for UTI  - nystatin powder  - f/u UA, UCx  - No clots seen on exam  - Uro c/s if clots noted  - abx has been started without UA or culture, discussed with nsg to send UA and culture

## 2023-09-15 NOTE — ED PROVIDER NOTE - PROGRESS NOTE DETAILS
Adan Diaz MD, PGY2  imaging non actionable. Pt unable to ambulate, will be admitted for failure to ambulate and PT/OT eval. Endorsed to hospitalist.

## 2023-09-15 NOTE — H&P ADULT - PROBLEM SELECTOR PLAN 5
Pt unsure of home medications  - [facility] med rec team emailed, f/u and complete med rec

## 2023-09-15 NOTE — H&P ADULT - NSHPLABSRESULTS_GEN_ALL_CORE
LABS:                          10.8   17.27 )-----------( 221      ( 15 Sep 2023 00:35 )             32.9     09-15    141  |  101  |  31<H>  ----------------------------<  112<H>  4.3   |  26  |  0.84    Ca    9.1      15 Sep 2023 00:35    TPro  6.7  /  Alb  3.9  /  TBili  1.1  /  DBili  x   /  AST  23  /  ALT  13  /  AlkPhos  112  09-15    LIVER FUNCTIONS - ( 15 Sep 2023 00:35 )  Alb: 3.9 g/dL / Pro: 6.7 g/dL / ALK PHOS: 112 U/L / ALT: 13 U/L / AST: 23 U/L / GGT: x             Urinalysis Basic - ( 15 Sep 2023 00:35 )    Color: x / Appearance: x / SG: x / pH: x  Gluc: 112 mg/dL / Ketone: x  / Bili: x / Urobili: x   Blood: x / Protein: x / Nitrite: x   Leuk Esterase: x / RBC: x / WBC x   Sq Epi: x / Non Sq Epi: x / Bacteria: x LABS:                          10.8   17.27 )-----------( 221      ( 15 Sep 2023 00:35 )             32.9     09-15    141  |  101  |  31<H>  ----------------------------<  112<H>  4.3   |  26  |  0.84    Ca    9.1      15 Sep 2023 00:35    TPro  6.7  /  Alb  3.9  /  TBili  1.1  /  DBili  x   /  AST  23  /  ALT  13  /  AlkPhos  112  09-15    LIVER FUNCTIONS - ( 15 Sep 2023 00:35 )  Alb: 3.9 g/dL / Pro: 6.7 g/dL / ALK PHOS: 112 U/L / ALT: 13 U/L / AST: 23 U/L / GGT: x             Urinalysis Basic - ( 15 Sep 2023 00:35 )    Color: x / Appearance: x / SG: x / pH: x  Gluc: 112 mg/dL / Ketone: x  / Bili: x / Urobili: x   Blood: x / Protein: x / Nitrite: x   Leuk Esterase: x / RBC: x / WBC x   Sq Epi: x / Non Sq Epi: x / Bacteria: x      IMAGING:      < from: CT Knee w/ IV Cont, Right (09.15.23 @ 02:44) >    IMPRESSION:    No obvious displaced fracture or dislocation. Suboptimal evaluation of   soft tissue including meniscus as clinically questioned. If there is   continued clinical suspicion for acute bony/soft tissue injury and when   patient is able to cooperate, follow-up MRI may be obtained for further   evaluation.    --- End of Report ---    < end of copied text >

## 2023-09-15 NOTE — H&P ADULT - NSHPREVIEWOFSYSTEMS_GEN_ALL_CORE
REVIEW OF SYSTEMS:    CONSTITUTIONAL: No fevers. No chills. No fatigue.  HEENT: No change in vision. No change in hearing. No sore throat.  CARDIOVASCULAR: No chest pain. No palpitations  RESPIRATORY: No shortness of breath. No cough.  GASTROINTESTINAL: No nausea. No vomiting. No abdominal pain. No change in bowel movements.  GENITOURINARY:  No change in urination frequency. +dysuria (burning) when urinating  SKIN: No rashes. No itching.   MUSCULOSKELETAL: No muscle aches. No stiffness. +R knee pain and tenderness but able to lift against gravity  PSYCHIATRIC: +schizoaffective disorder  NEUROLOGICAL: No headaches, dizziness. No change in bowel or bladder control  ENDOCRINOLOGIC: No reports of cold or heat intolerance. No polydipsia  ALLERGIES: No history of asthma, hives, or eczema

## 2023-09-15 NOTE — PHYSICAL THERAPY INITIAL EVALUATION ADULT - ADDITIONAL COMMENTS
Patient resides at [facility], patient was independent prior to admission with ambulation and transfers prior. Patient was not using an assistive device prior to admission.

## 2023-09-15 NOTE — H&P ADULT - NSHPPHYSICALEXAM_GEN_ALL_CORE
VITALS:   T(C): 37.1 (09-15-23 @ 04:34), Max: 37.2 (09-14-23 @ 22:11)  HR: 86 (09-15-23 @ 04:34) (83 - 86)  BP: 134/73 (09-15-23 @ 04:34) (126/65 - 134/73)  RR: 16 (09-15-23 @ 04:34) (16 - 16)  SpO2: 95% (09-15-23 @ 04:34) (95% - 99%)    GENERAL: No acute distress  EYES: EOMI, PERRLA; conjunctiva and sclera clear  ENMT: Moist oral mucosa  NECK: Supple, no palpable masses  RESPIRATORY: Lungs clear to ascultation b/l; unlabored respirations  CARDIOVASCULAR: Regular rate and rhythm, normal S1 and S2, no murmurs/rubs/gallops  ABDOMEN: Soft, normal bowel sounds, nondistended, nontender  MUSCULOSKELETAL: +TTP over R knee, with medial ecchymosis. +pain with full flexion and extension. Sensation intact.   PSYCH: Affect appropriate. Slow with responses but answers appropriately  NEUROLOGY: AAOx2, CNs grossly intact; baseline tremor in bilateral upper extremities, R>L  SKIN: No rashes; no palpable lesions

## 2023-09-15 NOTE — ED PROVIDER NOTE - CLINICAL SUMMARY MEDICAL DECISION MAKING FREE TEXT BOX
Adan Diaz MD, PGY2  73-year-old male with [facility] medical [facility] for right knee pain, inability to ambulate.  Patient states he fell yesterday, states it was a mechanical trip and fall witnessed by staff.  Patient has been unable to ambulate since.  Patient was sent earlier today to [facility], had x-rays of hip, knee, CT head/neck performed without any significant findings and was sent back to facility.  Facility sending back to emergency department for concern of meniscal injury, inability to ambulate.  Patient denies fever, headache, chest pain, shortness of breath, abdominal pain, nausea, vomiting, diarrhea, extremity edema, rash. Pt is s/p Rt hip ORIF on 8/14/23. Xrays from [facility] showing no acute fracture and hardware in appropriate alignment.     In the emergency department patient hemodynamically stable.  On exam patient has tenderness to palpation over medial and lateral joint lines of right knee with pain with full extension and flexion.  Patient has ecchymosis over the medial aspect of right knee.  Peripheral pulses intact, sensation intact peripherally.  Patient will likely require admission for PT/OT evaluation.  Discussed possibility of MRI, patient stating he gets very claustrophobic and would refuse MRI.  Will obtain repeat x-ray, CT of knee.  Will treat symptomatically with ibuprofen, Tylenol.  Will obtain basic labs as patient may require admission.

## 2023-09-15 NOTE — H&P ADULT - ASSESSMENT
73 year-old male with history of schizoaffective disorder, inpatient at [facility], sent to the ED for R knee pain and inability to ambulate, with CT and XR imaging without acute fractures or dislocations, admitted for further management and PT assessment

## 2023-09-15 NOTE — H&P ADULT - PROBLEM SELECTOR PLAN 2
Pt with dysuria (burning) for the past few days, also with intertrigo around groin and testes area  - Ceftriaxone for UTI  - nystatin powder  - f/u UA, UCx Pt with dysuria (burning)and hematuria for the past few days, also with intertrigo around groin and testes area. No flank pain but does endorse weight loss for the past year. Hematuria likely due to acute UTI but also possibly malignancy.   - Ceftriaxone for UTI  - nystatin powder  - f/u UA, UCx  - No clots seen on exam, Uro c/s if clots noted Pt with dysuria (burning)and hematuria for the past few days, also with intertrigo around groin and testes area. No flank pain but does endorse weight loss for the past year. Hematuria likely due to acute UTI but also possibly malignancy given weight loss and smoking history.   - Ceftriaxone for UTI  - nystatin powder  - f/u UA, UCx  - No clots seen on exam  - Uro c/s if clots noted

## 2023-09-15 NOTE — ED ADULT NURSE REASSESSMENT NOTE - NS ED NURSE REASSESS COMMENT FT1
Pt a&ox4, Denies CP, SOB, dyspnea, or pain at this time. Respirations are even and unlabored, no signs of respiratory distress. Changed and turned, redness noted to the scrotum and sacral region, stage 2 pressure ulcer, approximately [id number] to the upper medial sacral region. Approximately 1cm abrasion noted above the urethral, slight bleeding noted. Pt is denying pain or discomfort at the penile site, unknown etiology. MD Delgado made aware, no further interventions at this time. ZBIGNIEW Howard made aware of skin assessment.
Pt at baseline mental status. Denies CP, SOB, dyspnea, or pain at this time. Respirations are even and unlabored, no signs of respiratory distress.

## 2023-09-15 NOTE — H&P ADULT - TIME BILLING
Preparing to see the patient including review of tests and other providers' notes, confirming history with patient/chart, performing medical examination and evaluation, counseling and educating the patient, ordering medications, tests and procedures, communicating with other health care professionals, documenting clinical information in the EMR, independently interpreting results and communicating results to the patient, care coordination

## 2023-09-15 NOTE — H&P ADULT - PROBLEM SELECTOR PLAN 1
Patient presenting with R knee pain and inability to walk s/p mechanical fall, denies LOC nor prodromal symptoms witnessed by staff at [facility], possibly due to meniscal tear  - XR at [facility] (Tibia Fibula 2View, Knee, Hip 2Views) without acute fracture or dislocations, notes ORIF procedure transfixing proximal femoral fracture in R pelvis.   - CTH and cervical spine at [facility] documentation without acute fractures or significant osseous abnormalities, diffuse osteopenia noted  - CT Knee at [facility] without acute fractures or dislocations but suboptimal eval of meniscus and soft tissue  - PT c/s to assess dispo

## 2023-09-15 NOTE — ED PROVIDER NOTE - ATTENDING CONTRIBUTION TO CARE
Pt coming from [facility] for a trip and fall yesterday. He was seen and discharged an outside hospital - pt doesn't have any records with him. +ttp of RT knee, with mild swelling and eccyhmosis at medial aspect. No ttp of the spine, abd, chest, and extremities otherwise. Pt denies any preceding sxs prior to fall. States he slipped and fell. Sent in for MRI for RT knee pain. Pt reports he is claustrophobic and does not want to get MRI. Pt is unable to ambulate. Not a good candidate for knee immobilizer and crutches. Plan to do ct with IV contrast. Pt is so to next attending at 12a.

## 2023-09-15 NOTE — PHYSICAL THERAPY INITIAL EVALUATION ADULT - PATIENT/FAMILY/SIGNIFICANT OTHER GOALS STATEMENT, PT EVAL
Individual Follow-Up Form    10/17/2022    Clinical Status of Patient: Outpatient    Length of Service: 45 minutes    Continuing Medication: yes  Patches    Other Medications: none     Target Symptoms: Withdrawal and medication side effects. The following were rated moderate (3) to severe (4) on TCRS:  Moderate (3): desire/crave tobacco  Severe (4): none    Comments: Pt seen in office today. He continues to smoke 14 cigs/day. Pt remains on tobacco cessation medication of 21 mg nicotine patch QD. No adverse effects/mental changes noted at this time. Pt asked to reduce current smoking rate by 2 cigs/day. Reviewed coping strategies/stress management/habitual behavior/relapse prevention with patient. Exhaled carbon monoxide level was 9 ppm per [employer] (0-6 non-smoker). Will see pt back in office in 1 wk.      Diagnosis: F17.200    Next Visit: 1 week     To feel better

## 2023-09-15 NOTE — PROGRESS NOTE ADULT - PROBLEM SELECTOR PLAN 6
DVT ppx: Heparin SubQ  Diet: Regular    Plan discussed with ACP DVT ppx: Heparin SubQ  Diet: Regular    Wound care for sacral IAD & MAD    Plan discussed with ACP

## 2023-09-16 LAB
ANION GAP SERPL CALC-SCNC: 11 MMOL/L — SIGNIFICANT CHANGE UP (ref 7–14)
BUN SERPL-MCNC: 34 MG/DL — HIGH (ref 7–23)
CALCIUM SERPL-MCNC: 8.9 MG/DL — SIGNIFICANT CHANGE UP (ref 8.4–10.5)
CHLORIDE SERPL-SCNC: 103 MMOL/L — SIGNIFICANT CHANGE UP (ref 98–107)
CO2 SERPL-SCNC: 26 MMOL/L — SIGNIFICANT CHANGE UP (ref 22–31)
CREAT SERPL-MCNC: 0.67 MG/DL — SIGNIFICANT CHANGE UP (ref 0.5–1.3)
CULTURE RESULTS: SIGNIFICANT CHANGE UP
EGFR: 98 ML/MIN/1.73M2 — SIGNIFICANT CHANGE UP
GLUCOSE SERPL-MCNC: 120 MG/DL — HIGH (ref 70–99)
HCT VFR BLD CALC: 30.7 % — LOW (ref 39–50)
HGB BLD-MCNC: 10.2 G/DL — LOW (ref 13–17)
MCHC RBC-ENTMCNC: 29.7 PG — SIGNIFICANT CHANGE UP (ref 27–34)
MCHC RBC-ENTMCNC: 33.2 GM/DL — SIGNIFICANT CHANGE UP (ref 32–36)
MCV RBC AUTO: 89.5 FL — SIGNIFICANT CHANGE UP (ref 80–100)
NRBC # BLD: 0 /100 WBCS — SIGNIFICANT CHANGE UP (ref 0–0)
NRBC # FLD: 0 K/UL — SIGNIFICANT CHANGE UP (ref 0–0)
PLATELET # BLD AUTO: 205 K/UL — SIGNIFICANT CHANGE UP (ref 150–400)
POTASSIUM SERPL-MCNC: 3.8 MMOL/L — SIGNIFICANT CHANGE UP (ref 3.5–5.3)
POTASSIUM SERPL-SCNC: 3.8 MMOL/L — SIGNIFICANT CHANGE UP (ref 3.5–5.3)
RBC # BLD: 3.43 M/UL — LOW (ref 4.2–5.8)
RBC # FLD: 13.3 % — SIGNIFICANT CHANGE UP (ref 10.3–14.5)
SODIUM SERPL-SCNC: 140 MMOL/L — SIGNIFICANT CHANGE UP (ref 135–145)
SPECIMEN SOURCE: SIGNIFICANT CHANGE UP
WBC # BLD: 12.68 K/UL — HIGH (ref 3.8–10.5)
WBC # FLD AUTO: 12.68 K/UL — HIGH (ref 3.8–10.5)

## 2023-09-16 PROCEDURE — 99232 SBSQ HOSP IP/OBS MODERATE 35: CPT

## 2023-09-16 RX ORDER — ACETAMINOPHEN 500 MG
1000 TABLET ORAL ONCE
Refills: 0 | Status: COMPLETED | OUTPATIENT
Start: 2023-09-16 | End: 2023-09-16

## 2023-09-16 RX ORDER — IBUPROFEN 200 MG
400 TABLET ORAL EVERY 8 HOURS
Refills: 0 | Status: DISCONTINUED | OUTPATIENT
Start: 2023-09-16 | End: 2023-09-25

## 2023-09-16 RX ADMIN — TAMSULOSIN HYDROCHLORIDE 0.4 MILLIGRAM(S): 0.4 CAPSULE ORAL at 21:18

## 2023-09-16 RX ADMIN — LIDOCAINE 1 PATCH: 4 CREAM TOPICAL at 11:46

## 2023-09-16 RX ADMIN — Medication 81 MILLIGRAM(S): at 11:44

## 2023-09-16 RX ADMIN — NYSTATIN CREAM 1 APPLICATION(S): 100000 CREAM TOPICAL at 18:03

## 2023-09-16 RX ADMIN — Medication 650 MILLIGRAM(S): at 11:45

## 2023-09-16 RX ADMIN — Medication 400 MILLIGRAM(S): at 16:11

## 2023-09-16 RX ADMIN — Medication 5 MILLIGRAM(S): at 18:02

## 2023-09-16 RX ADMIN — Medication 5000 UNIT(S): at 11:44

## 2023-09-16 RX ADMIN — CEFTRIAXONE 100 MILLIGRAM(S): 500 INJECTION, POWDER, FOR SOLUTION INTRAMUSCULAR; INTRAVENOUS at 07:16

## 2023-09-16 RX ADMIN — LOSARTAN POTASSIUM 100 MILLIGRAM(S): 100 TABLET, FILM COATED ORAL at 05:02

## 2023-09-16 RX ADMIN — Medication 1000 MILLIGRAM(S): at 05:33

## 2023-09-16 RX ADMIN — Medication 400 MILLIGRAM(S): at 05:03

## 2023-09-16 RX ADMIN — Medication 1 TABLET(S): at 11:44

## 2023-09-16 RX ADMIN — PANTOPRAZOLE SODIUM 40 MILLIGRAM(S): 20 TABLET, DELAYED RELEASE ORAL at 05:02

## 2023-09-16 RX ADMIN — Medication 400 MILLIGRAM(S): at 15:41

## 2023-09-16 RX ADMIN — OLANZAPINE 10 MILLIGRAM(S): 15 TABLET, FILM COATED ORAL at 11:44

## 2023-09-16 RX ADMIN — LIDOCAINE 1 PATCH: 4 CREAM TOPICAL at 17:52

## 2023-09-16 RX ADMIN — LIDOCAINE 1 PATCH: 4 CREAM TOPICAL at 00:54

## 2023-09-16 RX ADMIN — SERTRALINE 250 MILLIGRAM(S): 25 TABLET, FILM COATED ORAL at 11:44

## 2023-09-16 RX ADMIN — HEPARIN SODIUM 5000 UNIT(S): 5000 INJECTION INTRAVENOUS; SUBCUTANEOUS at 13:20

## 2023-09-16 RX ADMIN — NYSTATIN CREAM 1 APPLICATION(S): 100000 CREAM TOPICAL at 05:03

## 2023-09-16 RX ADMIN — Medication 650 MILLIGRAM(S): at 12:15

## 2023-09-16 RX ADMIN — HEPARIN SODIUM 5000 UNIT(S): 5000 INJECTION INTRAVENOUS; SUBCUTANEOUS at 21:18

## 2023-09-16 RX ADMIN — Medication 650 MILLIGRAM(S): at 21:18

## 2023-09-16 NOTE — PROGRESS NOTE ADULT - SUBJECTIVE AND OBJECTIVE BOX
[facility] Division of Hospital Medicine  Farhana Melgoza MD  Hospitalist   Pager [phone]  Avaliable via MS teams     SUBJECTIVE / OVERNIGHT EVENTS: Pt seen and examined. patient continues to complain of right knee pain, continues to deny MRI, is Aox3. Pt amendable for Rehab.     ADDITIONAL REVIEW OF SYSTEMS:    MEDICATIONS  (STANDING):  aspirin  chewable 81 milliGRAM(s) Oral daily  cefTRIAXone   IVPB 1000 milliGRAM(s) IV Intermittent every 24 hours  cholecalciferol 5000 Unit(s) Oral daily  heparin   Injectable 5000 Unit(s) SubCutaneous every 8 hours  lidocaine   4% Patch 1 Patch Transdermal every 24 hours  losartan 100 milliGRAM(s) Oral daily  multivitamin 1 Tablet(s) Oral daily  nystatin Powder 1 Application(s) Topical two times a day  OLANZapine 10 milliGRAM(s) Oral daily  pantoprazole    Tablet 40 milliGRAM(s) Oral before breakfast  sertraline 250 milliGRAM(s) Oral daily  tamsulosin 0.4 milliGRAM(s) Oral at bedtime    MEDICATIONS  (PRN):  acetaminophen     Tablet .. 650 milliGRAM(s) Oral every 6 hours PRN Temp greater or equal to 38C (100.4F), Mild Pain (1 - 3)  aluminum hydroxide/magnesium hydroxide/simethicone Suspension 30 milliLiter(s) Oral every 4 hours PRN Dyspepsia  bisacodyl 5 milliGRAM(s) Oral every 12 hours PRN Constipation  diazepam    Tablet 5 milliGRAM(s) Oral daily PRN for moderate to severe anxiety  melatonin 3 milliGRAM(s) Oral at bedtime PRN Insomnia  senna 2 Tablet(s) Oral at bedtime PRN Constipation      I&O's Summary    15 Sep 2023 07:01  -  16 Sep 2023 07:00  --------------------------------------------------------  IN: 1410 mL / OUT: 750 mL / NET: 660 mL    16 Sep 2023 07:01  -  16 Sep 2023 14:18  --------------------------------------------------------  IN: 200 mL / OUT: 400 mL / NET: -200 mL        PHYSICAL EXAM:  Vital Signs Last 24 Hrs  T(C): 36.8 (16 Sep 2023 13:19), Max: 37.2 (16 Sep 2023 09:47)  T(F): 98.2 (16 Sep 2023 13:19), Max: 98.9 (16 Sep 2023 09:47)  HR: 75 (16 Sep 2023 13:19) (67 - 101)  BP: 133/71 (16 Sep 2023 13:19) (128/62 - 149/78)  BP(mean): --  RR: 16 (16 Sep 2023 13:19) (16 - 18)  SpO2: 98% (16 Sep 2023 13:19) (97% - 99%)    Parameters below as of 16 Sep 2023 13:19  Patient On (Oxygen Delivery Method): room air      PHYSICAL EXAM:  GENERAL: NAD  CHEST/LUNG: Clear to auscultation bilaterally; No wheeze  HEART: Regular rate and rhythm  ABDOMEN: Soft, Nontender, Nondistended  EXTREMITIES:  Rt knee medial side with minimal ecymosis, no LE edema  PSYCH: Calm  NEUROLOGY: AAOx3    LABS:                        10.2   12.68 )-----------( 205      ( 16 Sep 2023 10:23 )             30.7     09-16    140  |  103  |  34<H>  ----------------------------<  120<H>  3.8   |  26  |  0.67    Ca    8.9      16 Sep 2023 10:23  Phos  2.7     09-15  Mg     1.90     09-15    TPro  6.7  /  Alb  3.9  /  TBili  1.1  /  DBili  x   /  AST  23  /  ALT  13  /  AlkPhos  112  09-15      CARDIAC MARKERS ( 15 Sep 2023 05:50 )  x     / x     / 241 U/L / x     / x          Urinalysis Basic - ( 16 Sep 2023 10:23 )    Color: x / Appearance: x / SG: x / pH: x  Gluc: 120 mg/dL / Ketone: x  / Bili: x / Urobili: x   Blood: x / Protein: x / Nitrite: x   Leuk Esterase: x / RBC: x / WBC x   Sq Epi: x / Non Sq Epi: x / Bacteria: x

## 2023-09-16 NOTE — PROGRESS NOTE ADULT - PROBLEM SELECTOR PLAN 2
Pt with dysuria (burning)and hematuria for the past few days, also with intertrigo around groin and testes area. No flank pain but does endorse weight loss for the past year. Hematuria likely due to acute UTI but also possibly malignancy given weight loss and smoking history.   - s/p Ceftriaxone for UTI?   - nystatin powder  - f/u UCx  - No clots seen on exam  - Uro c/s if clots noted

## 2023-09-16 NOTE — PROVIDER CONTACT NOTE (OTHER) - BACKGROUND
[facility]/p [facility]; schizoaffective disorder

## 2023-09-17 LAB
HCV AB S/CO SERPL IA: 0.08 S/CO — SIGNIFICANT CHANGE UP (ref 0–0.99)
HCV AB SERPL-IMP: SIGNIFICANT CHANGE UP

## 2023-09-17 PROCEDURE — 99232 SBSQ HOSP IP/OBS MODERATE 35: CPT

## 2023-09-17 RX ORDER — ASPIRIN/CALCIUM CARB/MAGNESIUM 324 MG
81 TABLET ORAL DAILY
Refills: 0 | Status: DISCONTINUED | OUTPATIENT
Start: 2023-09-17 | End: 2023-09-25

## 2023-09-17 RX ADMIN — Medication 5000 UNIT(S): at 13:28

## 2023-09-17 RX ADMIN — OLANZAPINE 10 MILLIGRAM(S): 15 TABLET, FILM COATED ORAL at 12:06

## 2023-09-17 RX ADMIN — HEPARIN SODIUM 5000 UNIT(S): 5000 INJECTION INTRAVENOUS; SUBCUTANEOUS at 21:56

## 2023-09-17 RX ADMIN — TAMSULOSIN HYDROCHLORIDE 0.4 MILLIGRAM(S): 0.4 CAPSULE ORAL at 21:56

## 2023-09-17 RX ADMIN — Medication 81 MILLIGRAM(S): at 12:04

## 2023-09-17 RX ADMIN — PANTOPRAZOLE SODIUM 40 MILLIGRAM(S): 20 TABLET, DELAYED RELEASE ORAL at 05:31

## 2023-09-17 RX ADMIN — LIDOCAINE 1 PATCH: 4 CREAM TOPICAL at 12:03

## 2023-09-17 RX ADMIN — NYSTATIN CREAM 1 APPLICATION(S): 100000 CREAM TOPICAL at 05:32

## 2023-09-17 RX ADMIN — Medication 1 TABLET(S): at 12:04

## 2023-09-17 RX ADMIN — SERTRALINE 250 MILLIGRAM(S): 25 TABLET, FILM COATED ORAL at 12:04

## 2023-09-17 RX ADMIN — Medication 3 MILLIGRAM(S): at 21:56

## 2023-09-17 RX ADMIN — LOSARTAN POTASSIUM 100 MILLIGRAM(S): 100 TABLET, FILM COATED ORAL at 05:31

## 2023-09-17 RX ADMIN — HEPARIN SODIUM 5000 UNIT(S): 5000 INJECTION INTRAVENOUS; SUBCUTANEOUS at 05:31

## 2023-09-17 RX ADMIN — HEPARIN SODIUM 5000 UNIT(S): 5000 INJECTION INTRAVENOUS; SUBCUTANEOUS at 13:29

## 2023-09-17 RX ADMIN — LIDOCAINE 1 PATCH: 4 CREAM TOPICAL at 00:12

## 2023-09-17 RX ADMIN — NYSTATIN CREAM 1 APPLICATION(S): 100000 CREAM TOPICAL at 18:11

## 2023-09-17 NOTE — PROGRESS NOTE ADULT - PROBLEM SELECTOR PLAN 2
Pt with dysuria (burning)and hematuria for the past few days, also with intertrigo around groin and testes area. No flank pain but does endorse weight loss for the past year. Hematuria likely due to acute UTI but also possibly malignancy given weight loss and smoking history.   - s/p Ceftriaxone for UTI?   - nystatin powder  - UCx: negative   -if hematuria continues , outpt uro follow up

## 2023-09-17 NOTE — PROGRESS NOTE ADULT - PROBLEM SELECTOR PLAN 5
Pt unsure of home medications  - [facility] med rec team emailed, f/u and complete med rec

## 2023-09-17 NOTE — PROGRESS NOTE ADULT - SUBJECTIVE AND OBJECTIVE BOX
[facility] Division of Hospital Medicine  Farhana Melgoza MD  Hospitalist   Pager [phone]  Avaliable via MS teams     SUBJECTIVE / OVERNIGHT EVENTS: Pt seen and examined. patient continues to complain of right knee pain, continues to deny MRI, is Aox3. Pt amendable for Rehab.     ADDITIONAL REVIEW OF SYSTEMS:    MEDICATIONS  (STANDING):  aspirin  chewable 81 milliGRAM(s) Oral daily  cefTRIAXone   IVPB 1000 milliGRAM(s) IV Intermittent every 24 hours  cholecalciferol 5000 Unit(s) Oral daily  heparin   Injectable 5000 Unit(s) SubCutaneous every 8 hours  lidocaine   4% Patch 1 Patch Transdermal every 24 hours  losartan 100 milliGRAM(s) Oral daily  multivitamin 1 Tablet(s) Oral daily  nystatin Powder 1 Application(s) Topical two times a day  OLANZapine 10 milliGRAM(s) Oral daily  pantoprazole    Tablet 40 milliGRAM(s) Oral before breakfast  sertraline 250 milliGRAM(s) Oral daily  tamsulosin 0.4 milliGRAM(s) Oral at bedtime    MEDICATIONS  (PRN):  acetaminophen     Tablet .. 650 milliGRAM(s) Oral every 6 hours PRN Temp greater or equal to 38C (100.4F), Mild Pain (1 - 3)  aluminum hydroxide/magnesium hydroxide/simethicone Suspension 30 milliLiter(s) Oral every 4 hours PRN Dyspepsia  bisacodyl 5 milliGRAM(s) Oral every 12 hours PRN Constipation  diazepam    Tablet 5 milliGRAM(s) Oral daily PRN for moderate to severe anxiety  melatonin 3 milliGRAM(s) Oral at bedtime PRN Insomnia  senna 2 Tablet(s) Oral at bedtime PRN Constipation      I&O's Summary    15 Sep 2023 07:01  -  16 Sep 2023 07:00  --------------------------------------------------------  IN: 1410 mL / OUT: 750 mL / NET: 660 mL    16 Sep 2023 07:01  -  16 Sep 2023 14:18  --------------------------------------------------------  IN: 200 mL / OUT: 400 mL / NET: -200 mL        PHYSICAL EXAM:  Vital Signs Last 24 Hrs  T(C): 36.9 (17 Sep 2023 05:25), Max: 37.5 (16 Sep 2023 18:00)  T(F): 98.4 (17 Sep 2023 05:25), Max: 99.5 (16 Sep 2023 18:00)  HR: 77 (17 Sep 2023 05:25) (61 - 89)  BP: 139/75 (17 Sep 2023 05:25) (124/76 - 157/83)  BP(mean): 101 (17 Sep 2023 05:25) (101 - 101)  RR: 16 (17 Sep 2023 05:25) (16 - 18)  SpO2: 98% (17 Sep 2023 05:25) (98% - 99%)    Parameters below as of 17 Sep 2023 05:25  Patient On (Oxygen Delivery Method): room air        PHYSICAL EXAM:  GENERAL: NAD  CHEST/LUNG: Clear to auscultation bilaterally; No wheeze  HEART: Regular rate and rhythm  ABDOMEN: Soft, Nontender, Nondistended  EXTREMITIES:  Rt knee medial side with minimal ecymosis, no LE edema  PSYCH: Calm  NEUROLOGY: AAOx3, b/l UE baseline tremors      LABS:                          10.2   12.68 )-----------( 205      ( 16 Sep 2023 10:23 )             30.7     09-16    140  |  103  |  34<H>  ----------------------------<  120<H>  3.8   |  26  |  0.67    Ca    8.9      16 Sep 2023 10:23        CARDIAC MARKERS ( 15 Sep 2023 05:50 )  x     / x     / 241 U/L / x     / x          Urinalysis Basic - ( 16 Sep 2023 10:23 )    Color: x / Appearance: x / SG: x / pH: x  Gluc: 120 mg/dL / Ketone: x  / Bili: x / Urobili: x   Blood: x / Protein: x / Nitrite: x   Leuk Esterase: x / RBC: x / WBC x   Sq Epi: x / Non Sq Epi: x / Bacteria: x

## 2023-09-18 LAB
ANION GAP SERPL CALC-SCNC: 16 MMOL/L — HIGH (ref 7–14)
BASOPHILS # BLD AUTO: 0.03 K/UL — SIGNIFICANT CHANGE UP (ref 0–0.2)
BASOPHILS NFR BLD AUTO: 0.3 % — SIGNIFICANT CHANGE UP (ref 0–2)
BUN SERPL-MCNC: 23 MG/DL — SIGNIFICANT CHANGE UP (ref 7–23)
CALCIUM SERPL-MCNC: 8.5 MG/DL — SIGNIFICANT CHANGE UP (ref 8.4–10.5)
CHLORIDE SERPL-SCNC: 101 MMOL/L — SIGNIFICANT CHANGE UP (ref 98–107)
CO2 SERPL-SCNC: 21 MMOL/L — LOW (ref 22–31)
CREAT SERPL-MCNC: 0.55 MG/DL — SIGNIFICANT CHANGE UP (ref 0.5–1.3)
EGFR: 104 ML/MIN/1.73M2 — SIGNIFICANT CHANGE UP
EOSINOPHIL # BLD AUTO: 0.19 K/UL — SIGNIFICANT CHANGE UP (ref 0–0.5)
EOSINOPHIL NFR BLD AUTO: 1.9 % — SIGNIFICANT CHANGE UP (ref 0–6)
GLUCOSE SERPL-MCNC: 85 MG/DL — SIGNIFICANT CHANGE UP (ref 70–99)
HCT VFR BLD CALC: 31.2 % — LOW (ref 39–50)
HGB BLD-MCNC: 10.1 G/DL — LOW (ref 13–17)
IANC: 7.59 K/UL — HIGH (ref 1.8–7.4)
IMM GRANULOCYTES NFR BLD AUTO: 0.5 % — SIGNIFICANT CHANGE UP (ref 0–0.9)
LYMPHOCYTES # BLD AUTO: 1.01 K/UL — SIGNIFICANT CHANGE UP (ref 1–3.3)
LYMPHOCYTES # BLD AUTO: 10.1 % — LOW (ref 13–44)
MAGNESIUM SERPL-MCNC: 1.9 MG/DL — SIGNIFICANT CHANGE UP (ref 1.6–2.6)
MCHC RBC-ENTMCNC: 29.4 PG — SIGNIFICANT CHANGE UP (ref 27–34)
MCHC RBC-ENTMCNC: 32.4 GM/DL — SIGNIFICANT CHANGE UP (ref 32–36)
MCV RBC AUTO: 90.7 FL — SIGNIFICANT CHANGE UP (ref 80–100)
MONOCYTES # BLD AUTO: 1.14 K/UL — HIGH (ref 0–0.9)
MONOCYTES NFR BLD AUTO: 11.4 % — SIGNIFICANT CHANGE UP (ref 2–14)
NEUTROPHILS # BLD AUTO: 7.59 K/UL — HIGH (ref 1.8–7.4)
NEUTROPHILS NFR BLD AUTO: 75.8 % — SIGNIFICANT CHANGE UP (ref 43–77)
NRBC # BLD: 0 /100 WBCS — SIGNIFICANT CHANGE UP (ref 0–0)
NRBC # FLD: 0 K/UL — SIGNIFICANT CHANGE UP (ref 0–0)
PHOSPHATE SERPL-MCNC: 3 MG/DL — SIGNIFICANT CHANGE UP (ref 2.5–4.5)
PLATELET # BLD AUTO: 218 K/UL — SIGNIFICANT CHANGE UP (ref 150–400)
POTASSIUM SERPL-MCNC: 4.3 MMOL/L — SIGNIFICANT CHANGE UP (ref 3.5–5.3)
POTASSIUM SERPL-SCNC: 4.3 MMOL/L — SIGNIFICANT CHANGE UP (ref 3.5–5.3)
RBC # BLD: 3.44 M/UL — LOW (ref 4.2–5.8)
RBC # FLD: 13.3 % — SIGNIFICANT CHANGE UP (ref 10.3–14.5)
SODIUM SERPL-SCNC: 138 MMOL/L — SIGNIFICANT CHANGE UP (ref 135–145)
WBC # BLD: 10.01 K/UL — SIGNIFICANT CHANGE UP (ref 3.8–10.5)
WBC # FLD AUTO: 10.01 K/UL — SIGNIFICANT CHANGE UP (ref 3.8–10.5)

## 2023-09-18 PROCEDURE — 90792 PSYCH DIAG EVAL W/MED SRVCS: CPT

## 2023-09-18 PROCEDURE — 99232 SBSQ HOSP IP/OBS MODERATE 35: CPT | Mod: GC

## 2023-09-18 RX ADMIN — NYSTATIN CREAM 1 APPLICATION(S): 100000 CREAM TOPICAL at 17:00

## 2023-09-18 RX ADMIN — HEPARIN SODIUM 5000 UNIT(S): 5000 INJECTION INTRAVENOUS; SUBCUTANEOUS at 05:25

## 2023-09-18 RX ADMIN — LIDOCAINE 1 PATCH: 4 CREAM TOPICAL at 00:03

## 2023-09-18 RX ADMIN — HEPARIN SODIUM 5000 UNIT(S): 5000 INJECTION INTRAVENOUS; SUBCUTANEOUS at 14:28

## 2023-09-18 RX ADMIN — Medication 81 MILLIGRAM(S): at 11:31

## 2023-09-18 RX ADMIN — OLANZAPINE 10 MILLIGRAM(S): 15 TABLET, FILM COATED ORAL at 11:31

## 2023-09-18 RX ADMIN — Medication 1 TABLET(S): at 11:31

## 2023-09-18 RX ADMIN — LIDOCAINE 1 PATCH: 4 CREAM TOPICAL at 07:34

## 2023-09-18 RX ADMIN — TAMSULOSIN HYDROCHLORIDE 0.4 MILLIGRAM(S): 0.4 CAPSULE ORAL at 21:54

## 2023-09-18 RX ADMIN — SERTRALINE 250 MILLIGRAM(S): 25 TABLET, FILM COATED ORAL at 11:31

## 2023-09-18 RX ADMIN — NYSTATIN CREAM 1 APPLICATION(S): 100000 CREAM TOPICAL at 05:24

## 2023-09-18 RX ADMIN — HEPARIN SODIUM 5000 UNIT(S): 5000 INJECTION INTRAVENOUS; SUBCUTANEOUS at 21:54

## 2023-09-18 RX ADMIN — LOSARTAN POTASSIUM 100 MILLIGRAM(S): 100 TABLET, FILM COATED ORAL at 05:24

## 2023-09-18 RX ADMIN — Medication 5000 UNIT(S): at 12:22

## 2023-09-18 RX ADMIN — Medication 975 MILLIGRAM(S): at 07:34

## 2023-09-18 RX ADMIN — PANTOPRAZOLE SODIUM 40 MILLIGRAM(S): 20 TABLET, DELAYED RELEASE ORAL at 05:24

## 2023-09-18 RX ADMIN — Medication 650 MILLIGRAM(S): at 07:34

## 2023-09-18 RX ADMIN — Medication 600 MILLIGRAM(S): at 07:35

## 2023-09-18 NOTE — ADVANCED PRACTICE NURSE CONSULT - REASON FOR CONSULT
Patient seen on skin care rounds after wound care referral received for assessment of skin impairment and recommendations of topical management of a sacrum stage 2. As per H&P, patient is a 73 year-old male with history of schizoaffective disorder, inpatient at [facility], sent to the ED for R knee pain and inability to ambulate, with CT and XR imaging without acute fractures or dislocations, admitted for further management and PT assessment.    Chart reviewed: WBC: 10.01, H&H: 10.1/31.2, Platelets: 218, serum albumin: 3.9, BMI: 23.8, Kory 15.
Patient seen on skin care rounds after wound care referral received for assessment of skin impairment and recommendations of topical management of a sacrum stage 2. As per H&P, patient is a 73 year-old male with history of schizoaffective disorder, inpatient at [facility], sent to the ED for R knee pain and inability to ambulate. Patient states that he fell yesterday. Reports it being a significant fall due to a mechanical trip witnessed by staff. He has since been unable to ambulate. Was seen earlier at [facility] with X-rays negative for acute pathology as well as CTH negative. Sent back to [facility] but still unable to ambulate and was referred to [facility] ED. Patient denies losing loss of consciousness nor any symptoms of dizziness causing the fall. He denies having fevers/chills, chest pain, difficulty breathing, abdominal pain, diarrhea/constipation. Does endorse dysuria intermittently for the past few days.    Chart reviewed: WBC: 13.11, H&H: 10.3/31.8, Platelets: 194, serum albumin: 3.9, BMI: 23.8, Kory 18.

## 2023-09-18 NOTE — BH CONSULTATION LIAISON ASSESSMENT NOTE - NSBHCHARTREVIEWVS_PSY_A_CORE FT
Vital Signs Last 24 Hrs  T(C): 36.8 (18 Sep 2023 11:32), Max: 36.9 (18 Sep 2023 04:15)  T(F): 98.3 (18 Sep 2023 11:32), Max: 98.4 (18 Sep 2023 04:15)  HR: 71 (18 Sep 2023 11:32) (68 - 71)  BP: 140/71 (18 Sep 2023 11:32) (134/56 - 140/71)  BP(mean): 75 (18 Sep 2023 04:15) (75 - 75)  RR: 16 (18 Sep 2023 04:15) (16 - 16)  SpO2: 100% (18 Sep 2023 11:32) (97% - 100%)    Parameters below as of 18 Sep 2023 11:32  Patient On (Oxygen Delivery Method): room air

## 2023-09-18 NOTE — PROGRESS NOTE ADULT - SUBJECTIVE AND OBJECTIVE BOX
Patient is a 73y old  Male who presents with a chief complaint of R knee pain, inability to ambulate (17 Sep 2023 13:20)      SUBJECTIVE / OVERNIGHT EVENTS:    Review of Systems:     MEDICATIONS  (STANDING):  aspirin enteric coated 81 milliGRAM(s) Oral daily  cholecalciferol 5000 Unit(s) Oral daily  heparin   Injectable 5000 Unit(s) SubCutaneous every 8 hours  lidocaine   4% Patch 1 Patch Transdermal every 24 hours  LORazepam   Injectable 1 milliGRAM(s) IV Push once  losartan 100 milliGRAM(s) Oral daily  multivitamin 1 Tablet(s) Oral daily  nystatin Powder 1 Application(s) Topical two times a day  OLANZapine 10 milliGRAM(s) Oral daily  pantoprazole    Tablet 40 milliGRAM(s) Oral before breakfast  sertraline 250 milliGRAM(s) Oral daily  tamsulosin 0.4 milliGRAM(s) Oral at bedtime    MEDICATIONS  (PRN):  acetaminophen     Tablet .. 650 milliGRAM(s) Oral every 6 hours PRN Temp greater or equal to 38C (100.4F), Mild Pain (1 - 3)  aluminum hydroxide/magnesium hydroxide/simethicone Suspension 30 milliLiter(s) Oral every 4 hours PRN Dyspepsia  bisacodyl 5 milliGRAM(s) Oral every 12 hours PRN Constipation  diazepam    Tablet 5 milliGRAM(s) Oral daily PRN for moderate to severe anxiety  ibuprofen  Tablet. 400 milliGRAM(s) Oral every 8 hours PRN Moderate Pain (4 - 6), Severe Pain (7 - 10)  melatonin 3 milliGRAM(s) Oral at bedtime PRN Insomnia  senna 2 Tablet(s) Oral at bedtime PRN Constipation      PHYSICAL EXAM:    I&O's Summary    17 Sep 2023 07:01  -  18 Sep 2023 07:00  --------------------------------------------------------  IN: 150 mL / OUT: 200 mL / NET: -50 mL  ICU Vital Signs Last 24 Hrs  T(C): 36.9 (18 Sep 2023 04:15), Max: 37 (17 Sep 2023 13:41)  T(F): 98.4 (18 Sep 2023 04:15), Max: 98.6 (17 Sep 2023 13:41)  HR: 68 (18 Sep 2023 04:15) (68 - 72)  BP: 134/56 (18 Sep 2023 04:15) (134/56 - 138/74)  BP(mean): 75 (18 Sep 2023 04:15) (75 - 75)  ABP: --  ABP(mean): --  RR: 16 (18 Sep 2023 04:15) (16 - 17)  SpO2: 97% (18 Sep 2023 04:15) (97% - 99%)    O2 Parameters below as of 18 Sep 2023 04:15  Patient On (Oxygen Delivery Method): room air            LABS:  CAPILLARY BLOOD GLUCOSE                              10.1   10.01 )-----------( 218      ( 18 Sep 2023 06:19 )             31.2     09-18    138  |  101  |  23  ----------------------------<  85  4.3   |  21<L>  |  0.55    Ca    8.5      18 Sep 2023 06:19  Phos  3.0     09-18  Mg     1.90     09-18            Urinalysis Basic - ( 18 Sep 2023 06:19 )    Color: x / Appearance: x / SG: x / pH: x  Gluc: 85 mg/dL / Ketone: x  / Bili: x / Urobili: x   Blood: x / Protein: x / Nitrite: x   Leuk Esterase: x / RBC: x / WBC x   Sq Epi: x / Non Sq Epi: x / Bacteria: x        RADIOLOGY & ADDITIONAL TESTS:    Imaging Personally Reviewed:    Consultant(s) Notes Reviewed:      Care Discussed with Consultants/Other Providers: Patient is a 73y old  Male who presents with a chief complaint of R knee pain, inability to ambulate (17 Sep 2023 13:20)      SUBJECTIVE / OVERNIGHT EVENTS: Pt still reports having some pain in R knee. He reports he can lift leg and bend knee but with some pain and also pain at night if he lays on the right side. No CP, SOB, n/v/d.     Review of Systems:     MEDICATIONS  (STANDING):  aspirin enteric coated 81 milliGRAM(s) Oral daily  cholecalciferol 5000 Unit(s) Oral daily  heparin   Injectable 5000 Unit(s) SubCutaneous every 8 hours  lidocaine   4% Patch 1 Patch Transdermal every 24 hours  LORazepam   Injectable 1 milliGRAM(s) IV Push once  losartan 100 milliGRAM(s) Oral daily  multivitamin 1 Tablet(s) Oral daily  nystatin Powder 1 Application(s) Topical two times a day  OLANZapine 10 milliGRAM(s) Oral daily  pantoprazole    Tablet 40 milliGRAM(s) Oral before breakfast  sertraline 250 milliGRAM(s) Oral daily  tamsulosin 0.4 milliGRAM(s) Oral at bedtime    MEDICATIONS  (PRN):  acetaminophen     Tablet .. 650 milliGRAM(s) Oral every 6 hours PRN Temp greater or equal to 38C (100.4F), Mild Pain (1 - 3)  aluminum hydroxide/magnesium hydroxide/simethicone Suspension 30 milliLiter(s) Oral every 4 hours PRN Dyspepsia  bisacodyl 5 milliGRAM(s) Oral every 12 hours PRN Constipation  diazepam    Tablet 5 milliGRAM(s) Oral daily PRN for moderate to severe anxiety  ibuprofen  Tablet. 400 milliGRAM(s) Oral every 8 hours PRN Moderate Pain (4 - 6), Severe Pain (7 - 10)  melatonin 3 milliGRAM(s) Oral at bedtime PRN Insomnia  senna 2 Tablet(s) Oral at bedtime PRN Constipation      PHYSICAL EXAM:    I&O's Summary    17 Sep 2023 07:01  -  18 Sep 2023 07:00  --------------------------------------------------------  IN: 150 mL / OUT: 200 mL / NET: -50 mL  ICU Vital Signs Last 24 Hrs  T(C): 36.9 (18 Sep 2023 04:15), Max: 37 (17 Sep 2023 13:41)  T(F): 98.4 (18 Sep 2023 04:15), Max: 98.6 (17 Sep 2023 13:41)  HR: 68 (18 Sep 2023 04:15) (68 - 72)  BP: 134/56 (18 Sep 2023 04:15) (134/56 - 138/74)  BP(mean): 75 (18 Sep 2023 04:15) (75 - 75)  ABP: --  ABP(mean): --  RR: 16 (18 Sep 2023 04:15) (16 - 17)  SpO2: 97% (18 Sep 2023 04:15) (97% - 99%)    O2 Parameters below as of 18 Sep 2023 04:15  Patient On (Oxygen Delivery Method): room air    PHYSICAL EXAM:  GENERAL: NAD  CHEST/LUNG: Clear to auscultation bilaterally; No wheeze  HEART: Regular rate and rhythm  ABDOMEN: Soft, Nontender, Nondistended  EXTREMITIES:  Rt knee medial side with minimal ecymosis improved. Mild swelling of R knee when compared to L knee. Pt able to lift R leg against gravity and flex right knee joint.   PSYCH: Calm  NEUROLOGY: AAOx3, b/l UE baseline tremors        LABS:  CAPILLARY BLOOD GLUCOSE                              10.1   10.01 )-----------( 218      ( 18 Sep 2023 06:19 )             31.2     09-18    138  |  101  |  23  ----------------------------<  85  4.3   |  21<L>  |  0.55    Ca    8.5      18 Sep 2023 06:19  Phos  3.0     09-18  Mg     1.90     09-18            Urinalysis Basic - ( 18 Sep 2023 06:19 )    Color: x / Appearance: x / SG: x / pH: x  Gluc: 85 mg/dL / Ketone: x  / Bili: x / Urobili: x   Blood: x / Protein: x / Nitrite: x   Leuk Esterase: x / RBC: x / WBC x   Sq Epi: x / Non Sq Epi: x / Bacteria: x        RADIOLOGY & ADDITIONAL TESTS:    Imaging Personally Reviewed:    Consultant(s) Notes Reviewed:      Care Discussed with Consultants/Other Providers:

## 2023-09-18 NOTE — BH CONSULTATION LIAISON ASSESSMENT NOTE - MSE UNSTRUCTURED FT
Mental Status Exam:  Tea: well groomed, fair hygiene     Behavior: calm, cooperative, no psychomotor retardation/agitation  Motor: no tremors, EPS, or rigidity  Gait: did not assess, pt in bed  Speech: normal rate, rhythm, prosedy and volume   Mood: "upset"  Affect: anxious, congruent  Thought process: clear, goal directed   Thought Content: denies paranoia, delusions   Perception: denies AH/VH  SI: denies  HI: denies  Insight: poor  Judgment: poor    Cognitive Exam:  Orientation: AOx3

## 2023-09-18 NOTE — BH CONSULTATION LIAISON ASSESSMENT NOTE - HPI (INCLUDE ILLNESS QUALITY, SEVERITY, DURATION, TIMING, CONTEXT, MODIFYING FACTORS, ASSOCIATED SIGNS AND SYMPTOMS)
73 year-old male with PPH of schizoaffective disorder, inpatient at [facility] unit 8B, PMH of HTN, BPH, psych c/s for clearance for level II.     Patient mildly irritable, depressed and frustrated by his knee, feels hopeless at times, sleep/appetite fair, when asked about suicidal thoughts or actions patient answers "well I haven't have I?". Denies AH/VH, delusions or paranoia. Perseverates on knee and knee pain. Per staff no safety concerns, patient has been calmly sitting in bed, no dangerous or self harming behaviors.     Spoke to inpt psych MD Dr. Marsh ([phone] / [phone]) - states that patient has been on current meds for many months since admission to [facility] 4/23 for "danger to self" due ot what appears to be suicidal thoughts, anxiety, aggression, paranoia. Was at [facility] for >100d and then transferred to [facility]. There has been quite benign, stable, no recent SI/I/P or HI/I/P or AH/VH, has been at usual depressed, irritable and anxious baseline. Does not feel there are any issues precluding placement as plan from [facility] was to stepdown patient to nursing home setting anyway. Minimal family/friend supports.

## 2023-09-18 NOTE — ADVANCED PRACTICE NURSE CONSULT - RECOMMEDATIONS
Topical Recommendations    Sacrum to bilateral buttocks: Cleanse with NS, pat dry. Apply Liquid barrier film to periwound skin (allow to dry). Apply silicone foam with border, change daily and PRN if soiled.    Continue low air loss bed therapy, continue heel elevation, continue to turn & reposition per protocol, soft pillow between bony prominences, continue moisture management with barrier creams & single breathable pad, continue measures to decrease friction/shear/pressure. Continue with nutritional support as per dietary/orders.    Plan of care discussed with primary RN Mendel and primary ACP Misti.    Please contact Wound Care Service Line if we can be of further assistance (ext [phone]). 
Topical Recommendations    Sacrum to bilateral buttocks: Cleanse with skin cleanser, pat dry. Apply critic-aide moisture barrier ointment twice a day and PRN with incontinent episodes.     Continue low air loss bed therapy, continue heel elevation, continue to turn & reposition per protocol, soft pillow between bony prominences, continue moisture management with barrier creams & single breathable pad, continue measures to decrease friction/shear/pressure. Continue with nutritional support as per dietary/orders.     Plan of care discussed with primary ACP Wil.    Please contact Wound Care Service Line if we can be of further assistance (ext [phone]).

## 2023-09-18 NOTE — ADVANCED PRACTICE NURSE CONSULT - ASSESSMENT
General: A&Ox2, confused at times, complaining he has been in the hospital all night and has not been able to sleep, able to turn with 1x assistance, incontinent of urine and stool. Skin warm, dry with increased moisture in intertriginous folds, scattered areas of hyperpigmentation and hypopigmentation, scattered areas of ecchymosis on bilateral upper extremities with no hematomas. Blanchable erythema on bilateral heels.     L buttocks: IAD & MAD as evidenced by denuded epidermis exposing 100% pink dermis not over gilberto prominence in natural anatomical laying position. Periwound with maceration circumferentially. Scant serosanguinous drainage with cleansing, no odor. No induration, no erythema, no crepitus, no fluctuance, no edema, no temperature changes, no overt signs of infection noted. Goals of care: prevent from moisture, friction, shearing, pressure.
General: A&Ox1-2, OOB w standby assistance, incontinent of urine and stool. Skin warm, dry with increased moisture in intertriginous folds, scattered areas of hyperpigmentation and hypopigmentation, scattered areas of ecchymosis on bilateral upper extremities with no hematomas. Blanchable erythema on bilateral heels.     Sacrum to bilateral buttocks: IAD/MAD as evidenced by denuded epidermis in R inner gluteal cleft, not visible or over gilberto prominence in natural anatomical laying position. Periwound with maceration circumferentially. No induration, no erythema, no crepitus, no fluctuance, no edema, no temperature changes, no overt signs of infection noted.

## 2023-09-18 NOTE — PROGRESS NOTE ADULT - PROBLEM SELECTOR PLAN 2
Pt with dysuria (burning)and hematuria for the past few days, also with intertrigo around groin and testes area. No flank pain but does endorse weight loss for the past year. Hematuria likely due to acute UTI but also possibly malignancy given weight loss and smoking history.   UCx: negative   - S/p Ceftriaxone.   - nystatin powder  -Outpt f/u for hematuria. Especially since urine cx was negative

## 2023-09-18 NOTE — BH CONSULTATION LIAISON ASSESSMENT NOTE - CURRENT MEDICATION
MEDICATIONS  (STANDING):  aspirin enteric coated 81 milliGRAM(s) Oral daily  cholecalciferol 5000 Unit(s) Oral daily  heparin   Injectable 5000 Unit(s) SubCutaneous every 8 hours  lidocaine   4% Patch 1 Patch Transdermal every 24 hours  LORazepam   Injectable 1 milliGRAM(s) IV Push once  losartan 100 milliGRAM(s) Oral daily  multivitamin 1 Tablet(s) Oral daily  nystatin Powder 1 Application(s) Topical two times a day  OLANZapine 10 milliGRAM(s) Oral daily  pantoprazole    Tablet 40 milliGRAM(s) Oral before breakfast  sertraline 250 milliGRAM(s) Oral daily  tamsulosin 0.4 milliGRAM(s) Oral at bedtime    MEDICATIONS  (PRN):  acetaminophen     Tablet .. 650 milliGRAM(s) Oral every 6 hours PRN Temp greater or equal to 38C (100.4F), Mild Pain (1 - 3)  aluminum hydroxide/magnesium hydroxide/simethicone Suspension 30 milliLiter(s) Oral every 4 hours PRN Dyspepsia  bisacodyl 5 milliGRAM(s) Oral every 12 hours PRN Constipation  diazepam    Tablet 5 milliGRAM(s) Oral daily PRN for moderate to severe anxiety  ibuprofen  Tablet. 400 milliGRAM(s) Oral every 8 hours PRN Moderate Pain (4 - 6), Severe Pain (7 - 10)  melatonin 3 milliGRAM(s) Oral at bedtime PRN Insomnia  senna 2 Tablet(s) Oral at bedtime PRN Constipation

## 2023-09-18 NOTE — BH CONSULTATION LIAISON ASSESSMENT NOTE - RISK ASSESSMENT
chronic risk of harm due to psych history, chronically low/irritable mood, no acute SI/HI noted though may have some passive thoughts, no SA or self harming behaviors noted here or at [place] recently, cites God, prayer as protective factors

## 2023-09-18 NOTE — BH CONSULTATION LIAISON ASSESSMENT NOTE - SUMMARY
73 year-old male with PPH of schizoaffective disorder, inpatient at [facility] unit 8B, PMH of HTN, BPH, psych c/s for clearance for level II.     Patient appears to be depressed and anxious, per [facility] psychiatrist this is usual baseline, no acute concerns for SI/I/P or HI/I/P though patient is provocative about answering these questions and appears to lack basic coping skills owing to anxiety and obsessional thought patterns, which is likely what necessitated his prior protracted hospitalizations. Confirmed meds with Dr. Marsh. No concerns precluding NH placement from [facility] inpt psych, patient has been generally stable there.    Recs:  - no current need for 1:1, if patient becomes suicidal then would begin at that time  - c/w meds, Sertraline 250mg PO QD  - Increase Zyprexa to 2.5mg qAM + 10mg qHS   - PRN for agitation Zyprexa 5mg q6h PRN PO/IM for agitation  - patient perseverative on pain, defer to primary team re: pain mgmt.   - no psych c/i to discharge back to [facility] or to NH, per [facility] patient was pending discharge to NH setting anyway

## 2023-09-18 NOTE — PROGRESS NOTE ADULT - PROBLEM SELECTOR PLAN 5
DVT ppx: Heparin SubQ  Diet: Regular  Wound care for sacral IAD & MAD  Anemia- Normocytic. No signs of symptoms of active bleeding. Can obtain iron studies in the am. Monitor     Dispo planning. PT rec rehab. Will f/u SW/CM

## 2023-09-19 LAB
FERRITIN SERPL-MCNC: 887 NG/ML — HIGH (ref 30–400)
FOLATE SERPL-MCNC: >20 NG/ML — HIGH (ref 3.1–17.5)
IRON SATN MFR SERPL: 27 % — SIGNIFICANT CHANGE UP (ref 14–50)
IRON SATN MFR SERPL: 44 UG/DL — LOW (ref 45–165)
RBC # BLD: 3.85 M/UL — LOW (ref 4.2–5.8)
RETICS #: 64.3 K/UL — SIGNIFICANT CHANGE UP (ref 25–125)
RETICS/RBC NFR: 1.7 % — SIGNIFICANT CHANGE UP (ref 0.5–2.5)
TIBC SERPL-MCNC: 161 UG/DL — LOW (ref 220–430)
UIBC SERPL-MCNC: 117 UG/DL — SIGNIFICANT CHANGE UP (ref 110–370)
VIT B12 SERPL-MCNC: 596 PG/ML — SIGNIFICANT CHANGE UP (ref 200–900)

## 2023-09-19 PROCEDURE — 99233 SBSQ HOSP IP/OBS HIGH 50: CPT

## 2023-09-19 PROCEDURE — 99232 SBSQ HOSP IP/OBS MODERATE 35: CPT

## 2023-09-19 RX ADMIN — HEPARIN SODIUM 5000 UNIT(S): 5000 INJECTION INTRAVENOUS; SUBCUTANEOUS at 06:34

## 2023-09-19 RX ADMIN — PANTOPRAZOLE SODIUM 40 MILLIGRAM(S): 20 TABLET, DELAYED RELEASE ORAL at 06:34

## 2023-09-19 RX ADMIN — OLANZAPINE 10 MILLIGRAM(S): 15 TABLET, FILM COATED ORAL at 11:43

## 2023-09-19 RX ADMIN — Medication 81 MILLIGRAM(S): at 11:43

## 2023-09-19 RX ADMIN — Medication 5000 UNIT(S): at 11:42

## 2023-09-19 RX ADMIN — NYSTATIN CREAM 1 APPLICATION(S): 100000 CREAM TOPICAL at 17:06

## 2023-09-19 RX ADMIN — HEPARIN SODIUM 5000 UNIT(S): 5000 INJECTION INTRAVENOUS; SUBCUTANEOUS at 21:27

## 2023-09-19 RX ADMIN — Medication 1 TABLET(S): at 11:42

## 2023-09-19 RX ADMIN — TAMSULOSIN HYDROCHLORIDE 0.4 MILLIGRAM(S): 0.4 CAPSULE ORAL at 21:27

## 2023-09-19 RX ADMIN — LOSARTAN POTASSIUM 100 MILLIGRAM(S): 100 TABLET, FILM COATED ORAL at 06:34

## 2023-09-19 RX ADMIN — HEPARIN SODIUM 5000 UNIT(S): 5000 INJECTION INTRAVENOUS; SUBCUTANEOUS at 14:30

## 2023-09-19 RX ADMIN — SERTRALINE 250 MILLIGRAM(S): 25 TABLET, FILM COATED ORAL at 11:43

## 2023-09-19 RX ADMIN — NYSTATIN CREAM 1 APPLICATION(S): 100000 CREAM TOPICAL at 06:34

## 2023-09-19 NOTE — BH CONSULTATION LIAISON PROGRESS NOTE - NSBHASSESSMENTFT_PSY_ALL_CORE
73 year-old male with PPH of schizoaffective disorder, inpatient at [facility] unit 8B, PMH of HTN, BPH, psych c/s for clearance for level II.     Patient appears to be depressed and anxious, per [facility] psychiatrist this is usual baseline, no acute concerns for SI/I/P or HI/I/P though patient is provocative about answering these questions and appears to lack basic coping skills owing to anxiety and obsessional thought patterns, which is likely what necessitated his prior protracted hospitalizations. Confirmed meds with Dr. Marsh. No concerns precluding NH placement from [facility] inpt psych, patient has been generally stable there.    9/19: unchanged, perseverates on somatic concerns help seeking no acute safety concerns     Recs:  - no current need for 1:1, if patient becomes suicidal then would begin at that time  - c/w meds, Sertraline 250mg PO QD  - Change Zyprexa to 5mg qAM + 7.5mg qHS   - PRN for agitation Zyprexa 5mg q6h PRN PO/IM for agitation  - patient perseverative on pain, defer to primary team re: pain mgmt.   - no psych c/i to discharge back to [facility] or to NH, per [facility] patient was pending discharge to NH setting anyway

## 2023-09-19 NOTE — BH CONSULTATION LIAISON PROGRESS NOTE - NSBHFUPINTERVALHXFT_PSY_A_CORE
patient frustrated by knee pain, difficulty with ambulation no signs of si/hi, limited coping skills perseverates on somatic concerns

## 2023-09-19 NOTE — BH CONSULTATION LIAISON PROGRESS NOTE - NSBHCHARTREVIEWVS_PSY_A_CORE FT
Vital Signs Last 24 Hrs  T(C): 36.8 (19 Sep 2023 06:02), Max: 37.1 (18 Sep 2023 22:22)  T(F): 98.3 (19 Sep 2023 06:02), Max: 98.8 (18 Sep 2023 22:22)  HR: 73 (19 Sep 2023 06:02) (71 - 74)  BP: 149/80 (19 Sep 2023 06:02) (134/80 - 149/80)  BP(mean): --  RR: 18 (19 Sep 2023 06:02) (17 - 18)  SpO2: 100% (19 Sep 2023 06:02) (99% - 100%)    Parameters below as of 19 Sep 2023 06:02  Patient On (Oxygen Delivery Method): room air

## 2023-09-19 NOTE — BH CONSULTATION LIAISON PROGRESS NOTE - NSBHCONSFOLLOWNEEDS_PSY_ALL_CORE
Pt notified that Dr. Clarke approved the Bactrim, and she is to have it refilled thru PCP or needs to see Dr. Clarke again.  Leidy Ceron LPN on 3/6/2018 at 9:44 AM     No psychiatric contraindications to discharge

## 2023-09-19 NOTE — BH CONSULTATION LIAISON PROGRESS NOTE - MSE UNSTRUCTURED FT
Mental Status Exam:  Tea: well groomed, fair hygiene     Behavior: calm, cooperative, no psychomotor retardation/agitation  Motor: no tremors, EPS, or rigidity  Gait: did not assess, pt in bed  Speech: normal rate, rhythm, prosedy and volume   Mood: "upset"  Affect: anxious, congruent  Thought process: clear, goal directed   Thought Content: denies paranoia, delusions   Perception: denies AH/VH  SI: denies  HI: denies  Insight: poor  Judgment: poor    Cognitive Exam:  Orientation: AOx3 Fluconazole Counseling:  Patient counseled regarding adverse effects of fluconazole including but not limited to headache, diarrhea, nausea, upset stomach, liver function test abnormalities, taste disturbance, and stomach pain.  There is a rare possibility of liver failure that can occur when taking fluconazole.  The patient understands that monitoring of LFTs and kidney function test may be required, especially at baseline. The patient verbalized understanding of the proper use and possible adverse effects of fluconazole.  All of the patient's questions and concerns were addressed.

## 2023-09-19 NOTE — PROGRESS NOTE ADULT - PROBLEM SELECTOR PLAN 5
DVT ppx: Heparin SubQ  Diet: Regular  Wound care for sacral IAD & MAD  Anemia- Normocytic. No signs of symptoms of active bleeding. Can obtain iron studies in the am. Monitor     Dispo planning. PT rec rehab. Will f/u SW/CM daily who is working on arrangements.     Case d/w Dr. Laguerre and Dr. Fuchs (Internal Medicine Doctor from [facility])

## 2023-09-20 LAB
ANION GAP SERPL CALC-SCNC: 12 MMOL/L — SIGNIFICANT CHANGE UP (ref 7–14)
BUN SERPL-MCNC: 31 MG/DL — HIGH (ref 7–23)
CALCIUM SERPL-MCNC: 8.9 MG/DL — SIGNIFICANT CHANGE UP (ref 8.4–10.5)
CHLORIDE SERPL-SCNC: 104 MMOL/L — SIGNIFICANT CHANGE UP (ref 98–107)
CO2 SERPL-SCNC: 25 MMOL/L — SIGNIFICANT CHANGE UP (ref 22–31)
CREAT SERPL-MCNC: 0.73 MG/DL — SIGNIFICANT CHANGE UP (ref 0.5–1.3)
EGFR: 95 ML/MIN/1.73M2 — SIGNIFICANT CHANGE UP
GLUCOSE SERPL-MCNC: 91 MG/DL — SIGNIFICANT CHANGE UP (ref 70–99)
HCT VFR BLD CALC: 35.1 % — LOW (ref 39–50)
HGB BLD-MCNC: 11.2 G/DL — LOW (ref 13–17)
MAGNESIUM SERPL-MCNC: 2 MG/DL — SIGNIFICANT CHANGE UP (ref 1.6–2.6)
MCHC RBC-ENTMCNC: 29.7 PG — SIGNIFICANT CHANGE UP (ref 27–34)
MCHC RBC-ENTMCNC: 31.9 GM/DL — LOW (ref 32–36)
MCV RBC AUTO: 93.1 FL — SIGNIFICANT CHANGE UP (ref 80–100)
NRBC # BLD: 0 /100 WBCS — SIGNIFICANT CHANGE UP (ref 0–0)
NRBC # FLD: 0 K/UL — SIGNIFICANT CHANGE UP (ref 0–0)
PHOSPHATE SERPL-MCNC: 3 MG/DL — SIGNIFICANT CHANGE UP (ref 2.5–4.5)
PLATELET # BLD AUTO: 266 K/UL — SIGNIFICANT CHANGE UP (ref 150–400)
POTASSIUM SERPL-MCNC: 4.2 MMOL/L — SIGNIFICANT CHANGE UP (ref 3.5–5.3)
POTASSIUM SERPL-SCNC: 4.2 MMOL/L — SIGNIFICANT CHANGE UP (ref 3.5–5.3)
RBC # BLD: 3.77 M/UL — LOW (ref 4.2–5.8)
RBC # FLD: 13.4 % — SIGNIFICANT CHANGE UP (ref 10.3–14.5)
SODIUM SERPL-SCNC: 141 MMOL/L — SIGNIFICANT CHANGE UP (ref 135–145)
WBC # BLD: 9.42 K/UL — SIGNIFICANT CHANGE UP (ref 3.8–10.5)
WBC # FLD AUTO: 9.42 K/UL — SIGNIFICANT CHANGE UP (ref 3.8–10.5)

## 2023-09-20 PROCEDURE — 99232 SBSQ HOSP IP/OBS MODERATE 35: CPT

## 2023-09-20 RX ADMIN — TAMSULOSIN HYDROCHLORIDE 0.4 MILLIGRAM(S): 0.4 CAPSULE ORAL at 21:36

## 2023-09-20 RX ADMIN — HEPARIN SODIUM 5000 UNIT(S): 5000 INJECTION INTRAVENOUS; SUBCUTANEOUS at 14:29

## 2023-09-20 RX ADMIN — Medication 5000 UNIT(S): at 11:56

## 2023-09-20 RX ADMIN — SERTRALINE 250 MILLIGRAM(S): 25 TABLET, FILM COATED ORAL at 11:56

## 2023-09-20 RX ADMIN — Medication 81 MILLIGRAM(S): at 11:55

## 2023-09-20 RX ADMIN — NYSTATIN CREAM 1 APPLICATION(S): 100000 CREAM TOPICAL at 05:03

## 2023-09-20 RX ADMIN — Medication 5 MILLIGRAM(S): at 17:31

## 2023-09-20 RX ADMIN — LOSARTAN POTASSIUM 100 MILLIGRAM(S): 100 TABLET, FILM COATED ORAL at 05:02

## 2023-09-20 RX ADMIN — Medication 1 TABLET(S): at 11:55

## 2023-09-20 RX ADMIN — OLANZAPINE 10 MILLIGRAM(S): 15 TABLET, FILM COATED ORAL at 11:57

## 2023-09-20 RX ADMIN — PANTOPRAZOLE SODIUM 40 MILLIGRAM(S): 20 TABLET, DELAYED RELEASE ORAL at 05:02

## 2023-09-20 RX ADMIN — HEPARIN SODIUM 5000 UNIT(S): 5000 INJECTION INTRAVENOUS; SUBCUTANEOUS at 21:36

## 2023-09-20 RX ADMIN — NYSTATIN CREAM 1 APPLICATION(S): 100000 CREAM TOPICAL at 17:01

## 2023-09-20 RX ADMIN — HEPARIN SODIUM 5000 UNIT(S): 5000 INJECTION INTRAVENOUS; SUBCUTANEOUS at 05:04

## 2023-09-20 NOTE — PROGRESS NOTE ADULT - PROBLEM SELECTOR PLAN 5
DVT ppx: Heparin SubQ  Diet: Regular  Wound care for sacral IAD & MAD  Anemia- Normocytic. No signs of symptoms of active bleeding. Iron studies c/w AOCD.  Monitor     Dispo planning. PT rec rehab. Will f/u SW/ELLY daily who is working on arrangements.     Case d/w Dr. Laguerre

## 2023-09-20 NOTE — PROGRESS NOTE ADULT - SUBJECTIVE AND OBJECTIVE BOX
Patient is a 73y old  Male who presents with a chief complaint of R knee pain, inability to ambulate (19 Sep 2023 10:06)      SUBJECTIVE / OVERNIGHT EVENTS:    Review of Systems:     MEDICATIONS  (STANDING):  aspirin enteric coated 81 milliGRAM(s) Oral daily  cholecalciferol 5000 Unit(s) Oral daily  heparin   Injectable 5000 Unit(s) SubCutaneous every 8 hours  lidocaine   4% Patch 1 Patch Transdermal every 24 hours  LORazepam   Injectable 1 milliGRAM(s) IV Push once  losartan 100 milliGRAM(s) Oral daily  multivitamin 1 Tablet(s) Oral daily  nystatin Powder 1 Application(s) Topical two times a day  OLANZapine 10 milliGRAM(s) Oral daily  pantoprazole    Tablet 40 milliGRAM(s) Oral before breakfast  sertraline 250 milliGRAM(s) Oral daily  tamsulosin 0.4 milliGRAM(s) Oral at bedtime    MEDICATIONS  (PRN):  acetaminophen     Tablet .. 650 milliGRAM(s) Oral every 6 hours PRN Temp greater or equal to 38C (100.4F), Mild Pain (1 - 3)  aluminum hydroxide/magnesium hydroxide/simethicone Suspension 30 milliLiter(s) Oral every 4 hours PRN Dyspepsia  bisacodyl 5 milliGRAM(s) Oral every 12 hours PRN Constipation  diazepam    Tablet 5 milliGRAM(s) Oral daily PRN for moderate to severe anxiety  ibuprofen  Tablet. 400 milliGRAM(s) Oral every 8 hours PRN Moderate Pain (4 - 6), Severe Pain (7 - 10)  melatonin 3 milliGRAM(s) Oral at bedtime PRN Insomnia  senna 2 Tablet(s) Oral at bedtime PRN Constipation      PHYSICAL EXAM:    I&O's Summary    ICU Vital Signs Last 24 Hrs  T(C): 37 (20 Sep 2023 04:30), Max: 37.2 (19 Sep 2023 11:43)  T(F): 98.6 (20 Sep 2023 04:30), Max: 98.9 (19 Sep 2023 11:43)  HR: 72 (20 Sep 2023 04:30) (68 - 76)  BP: 155/75 (20 Sep 2023 04:30) (136/60 - 155/75)  BP(mean): 106 (20 Sep 2023 04:30) (102 - 106)  ABP: --  ABP(mean): --  RR: 18 (20 Sep 2023 04:30) (17 - 18)  SpO2: 100% (20 Sep 2023 04:30) (100% - 100%)    O2 Parameters below as of 20 Sep 2023 04:30  Patient On (Oxygen Delivery Method): room air          LABS:  CAPILLARY BLOOD GLUCOSE                              11.2   9.42  )-----------( 266      ( 20 Sep 2023 05:30 )             35.1     09-20    141  |  104  |  31<H>  ----------------------------<  91  4.2   |  25  |  0.73    Ca    8.9      20 Sep 2023 05:30  Phos  3.0     09-20  Mg     2.00     09-20            Urinalysis Basic - ( 20 Sep 2023 05:30 )    Color: x / Appearance: x / SG: x / pH: x  Gluc: 91 mg/dL / Ketone: x  / Bili: x / Urobili: x   Blood: x / Protein: x / Nitrite: x   Leuk Esterase: x / RBC: x / WBC x   Sq Epi: x / Non Sq Epi: x / Bacteria: x        RADIOLOGY & ADDITIONAL TESTS:    Imaging Personally Reviewed:    Consultant(s) Notes Reviewed:      Care Discussed with Consultants/Other Providers: Patient is a 73y old  Male who presents with a chief complaint of R knee pain, inability to ambulate (19 Sep 2023 10:06)      SUBJECTIVE / OVERNIGHT EVENTS: No CP, SOB, n/v/d.     Review of Systems:     MEDICATIONS  (STANDING):  aspirin enteric coated 81 milliGRAM(s) Oral daily  cholecalciferol 5000 Unit(s) Oral daily  heparin   Injectable 5000 Unit(s) SubCutaneous every 8 hours  lidocaine   4% Patch 1 Patch Transdermal every 24 hours  LORazepam   Injectable 1 milliGRAM(s) IV Push once  losartan 100 milliGRAM(s) Oral daily  multivitamin 1 Tablet(s) Oral daily  nystatin Powder 1 Application(s) Topical two times a day  OLANZapine 10 milliGRAM(s) Oral daily  pantoprazole    Tablet 40 milliGRAM(s) Oral before breakfast  sertraline 250 milliGRAM(s) Oral daily  tamsulosin 0.4 milliGRAM(s) Oral at bedtime    MEDICATIONS  (PRN):  acetaminophen     Tablet .. 650 milliGRAM(s) Oral every 6 hours PRN Temp greater or equal to 38C (100.4F), Mild Pain (1 - 3)  aluminum hydroxide/magnesium hydroxide/simethicone Suspension 30 milliLiter(s) Oral every 4 hours PRN Dyspepsia  bisacodyl 5 milliGRAM(s) Oral every 12 hours PRN Constipation  diazepam    Tablet 5 milliGRAM(s) Oral daily PRN for moderate to severe anxiety  ibuprofen  Tablet. 400 milliGRAM(s) Oral every 8 hours PRN Moderate Pain (4 - 6), Severe Pain (7 - 10)  melatonin 3 milliGRAM(s) Oral at bedtime PRN Insomnia  senna 2 Tablet(s) Oral at bedtime PRN Constipation      PHYSICAL EXAM:    I&O's Summary    ICU Vital Signs Last 24 Hrs  T(C): 37 (20 Sep 2023 04:30), Max: 37.2 (19 Sep 2023 11:43)  T(F): 98.6 (20 Sep 2023 04:30), Max: 98.9 (19 Sep 2023 11:43)  HR: 72 (20 Sep 2023 04:30) (68 - 76)  BP: 155/75 (20 Sep 2023 04:30) (136/60 - 155/75)  BP(mean): 106 (20 Sep 2023 04:30) (102 - 106)  ABP: --  ABP(mean): --  RR: 18 (20 Sep 2023 04:30) (17 - 18)  SpO2: 100% (20 Sep 2023 04:30) (100% - 100%)    O2 Parameters below as of 20 Sep 2023 04:30  Patient On (Oxygen Delivery Method): room air    PHYSICAL EXAM:  GENERAL: NAD  CHEST/LUNG: Clear to auscultation bilaterally; No wheeze  HEART: Regular rate and rhythm  ABDOMEN: Soft, Nontender, Nondistended  EXTREMITIES:  Rt knee medial side with minimal ecymosis resolved. Mild swelling of R knee when compared to L knee. Pt able to lift R leg against gravity and flex right knee joint   PSYCH: Calm  NEUROLOGY: AAOx3, b/l UE baseline tremors      LABS:  CAPILLARY BLOOD GLUCOSE                              11.2   9.42  )-----------( 266      ( 20 Sep 2023 05:30 )             35.1     09-20    141  |  104  |  31<H>  ----------------------------<  91  4.2   |  25  |  0.73    Ca    8.9      20 Sep 2023 05:30  Phos  3.0     09-20  Mg     2.00     09-20            Urinalysis Basic - ( 20 Sep 2023 05:30 )    Color: x / Appearance: x / SG: x / pH: x  Gluc: 91 mg/dL / Ketone: x  / Bili: x / Urobili: x   Blood: x / Protein: x / Nitrite: x   Leuk Esterase: x / RBC: x / WBC x   Sq Epi: x / Non Sq Epi: x / Bacteria: x        RADIOLOGY & ADDITIONAL TESTS:    Imaging Personally Reviewed:    Consultant(s) Notes Reviewed:      Care Discussed with Consultants/Other Providers:

## 2023-09-21 PROCEDURE — 99232 SBSQ HOSP IP/OBS MODERATE 35: CPT

## 2023-09-21 RX ADMIN — TAMSULOSIN HYDROCHLORIDE 0.4 MILLIGRAM(S): 0.4 CAPSULE ORAL at 22:08

## 2023-09-21 RX ADMIN — LOSARTAN POTASSIUM 100 MILLIGRAM(S): 100 TABLET, FILM COATED ORAL at 06:25

## 2023-09-21 RX ADMIN — SERTRALINE 250 MILLIGRAM(S): 25 TABLET, FILM COATED ORAL at 11:48

## 2023-09-21 RX ADMIN — LIDOCAINE 1 PATCH: 4 CREAM TOPICAL at 23:34

## 2023-09-21 RX ADMIN — Medication 650 MILLIGRAM(S): at 12:40

## 2023-09-21 RX ADMIN — HEPARIN SODIUM 5000 UNIT(S): 5000 INJECTION INTRAVENOUS; SUBCUTANEOUS at 06:27

## 2023-09-21 RX ADMIN — OLANZAPINE 10 MILLIGRAM(S): 15 TABLET, FILM COATED ORAL at 11:40

## 2023-09-21 RX ADMIN — LIDOCAINE 1 PATCH: 4 CREAM TOPICAL at 11:40

## 2023-09-21 RX ADMIN — NYSTATIN CREAM 1 APPLICATION(S): 100000 CREAM TOPICAL at 17:10

## 2023-09-21 RX ADMIN — Medication 1 TABLET(S): at 11:39

## 2023-09-21 RX ADMIN — Medication 5000 UNIT(S): at 16:35

## 2023-09-21 RX ADMIN — HEPARIN SODIUM 5000 UNIT(S): 5000 INJECTION INTRAVENOUS; SUBCUTANEOUS at 22:08

## 2023-09-21 RX ADMIN — HEPARIN SODIUM 5000 UNIT(S): 5000 INJECTION INTRAVENOUS; SUBCUTANEOUS at 14:56

## 2023-09-21 RX ADMIN — NYSTATIN CREAM 1 APPLICATION(S): 100000 CREAM TOPICAL at 06:25

## 2023-09-21 RX ADMIN — Medication 650 MILLIGRAM(S): at 11:47

## 2023-09-21 RX ADMIN — Medication 81 MILLIGRAM(S): at 11:39

## 2023-09-21 RX ADMIN — PANTOPRAZOLE SODIUM 40 MILLIGRAM(S): 20 TABLET, DELAYED RELEASE ORAL at 06:26

## 2023-09-21 RX ADMIN — LIDOCAINE 1 PATCH: 4 CREAM TOPICAL at 19:35

## 2023-09-21 NOTE — PROGRESS NOTE ADULT - SUBJECTIVE AND OBJECTIVE BOX
Patient is a 73y old  Male who presents with a chief complaint of R knee pain, inability to ambulate (20 Sep 2023 09:29)      SUBJECTIVE / OVERNIGHT EVENTS:    Review of Systems:     MEDICATIONS  (STANDING):  aspirin enteric coated 81 milliGRAM(s) Oral daily  cholecalciferol 5000 Unit(s) Oral daily  heparin   Injectable 5000 Unit(s) SubCutaneous every 8 hours  lidocaine   4% Patch 1 Patch Transdermal every 24 hours  LORazepam   Injectable 1 milliGRAM(s) IV Push once  losartan 100 milliGRAM(s) Oral daily  multivitamin 1 Tablet(s) Oral daily  nystatin Powder 1 Application(s) Topical two times a day  OLANZapine 10 milliGRAM(s) Oral daily  pantoprazole    Tablet 40 milliGRAM(s) Oral before breakfast  sertraline 250 milliGRAM(s) Oral daily  tamsulosin 0.4 milliGRAM(s) Oral at bedtime    MEDICATIONS  (PRN):  acetaminophen     Tablet .. 650 milliGRAM(s) Oral every 6 hours PRN Temp greater or equal to 38C (100.4F), Mild Pain (1 - 3)  aluminum hydroxide/magnesium hydroxide/simethicone Suspension 30 milliLiter(s) Oral every 4 hours PRN Dyspepsia  bisacodyl 5 milliGRAM(s) Oral every 12 hours PRN Constipation  diazepam    Tablet 5 milliGRAM(s) Oral daily PRN for moderate to severe anxiety  ibuprofen  Tablet. 400 milliGRAM(s) Oral every 8 hours PRN Moderate Pain (4 - 6), Severe Pain (7 - 10)  melatonin 3 milliGRAM(s) Oral at bedtime PRN Insomnia  senna 2 Tablet(s) Oral at bedtime PRN Constipation      PHYSICAL EXAM:    I&O's Summary    ICU Vital Signs Last 24 Hrs  T(C): 36.7 (21 Sep 2023 04:15), Max: 37.3 (20 Sep 2023 22:29)  T(F): 98 (21 Sep 2023 04:15), Max: 99.2 (20 Sep 2023 22:29)  HR: 75 (21 Sep 2023 04:15) (71 - 75)  BP: 136/75 (21 Sep 2023 04:15) (136/75 - 157/69)  BP(mean): 78 (21 Sep 2023 04:15) (78 - 87)  ABP: --  ABP(mean): --  RR: 18 (21 Sep 2023 04:15) (18 - 18)  SpO2: 97% (21 Sep 2023 04:15) (97% - 100%)    O2 Parameters below as of 21 Sep 2023 04:15  Patient On (Oxygen Delivery Method): room air            LABS:  CAPILLARY BLOOD GLUCOSE                              11.2   9.42  )-----------( 266      ( 20 Sep 2023 05:30 )             35.1     09-20    141  |  104  |  31<H>  ----------------------------<  91  4.2   |  25  |  0.73    Ca    8.9      20 Sep 2023 05:30  Phos  3.0     09-20  Mg     2.00     09-20            Urinalysis Basic - ( 20 Sep 2023 05:30 )    Color: x / Appearance: x / SG: x / pH: x  Gluc: 91 mg/dL / Ketone: x  / Bili: x / Urobili: x   Blood: x / Protein: x / Nitrite: x   Leuk Esterase: x / RBC: x / WBC x   Sq Epi: x / Non Sq Epi: x / Bacteria: x        RADIOLOGY & ADDITIONAL TESTS:    Imaging Personally Reviewed:    Consultant(s) Notes Reviewed:      Care Discussed with Consultants/Other Providers: Patient is a 73y old  Male who presents with a chief complaint of R knee pain, inability to ambulate (20 Sep 2023 09:29)      SUBJECTIVE / OVERNIGHT EVENTS:No CP, SOB, n/v. +Knee pain but able to move around joint     Review of Systems:     MEDICATIONS  (STANDING):  aspirin enteric coated 81 milliGRAM(s) Oral daily  cholecalciferol 5000 Unit(s) Oral daily  heparin   Injectable 5000 Unit(s) SubCutaneous every 8 hours  lidocaine   4% Patch 1 Patch Transdermal every 24 hours  LORazepam   Injectable 1 milliGRAM(s) IV Push once  losartan 100 milliGRAM(s) Oral daily  multivitamin 1 Tablet(s) Oral daily  nystatin Powder 1 Application(s) Topical two times a day  OLANZapine 10 milliGRAM(s) Oral daily  pantoprazole    Tablet 40 milliGRAM(s) Oral before breakfast  sertraline 250 milliGRAM(s) Oral daily  tamsulosin 0.4 milliGRAM(s) Oral at bedtime    MEDICATIONS  (PRN):  acetaminophen     Tablet .. 650 milliGRAM(s) Oral every 6 hours PRN Temp greater or equal to 38C (100.4F), Mild Pain (1 - 3)  aluminum hydroxide/magnesium hydroxide/simethicone Suspension 30 milliLiter(s) Oral every 4 hours PRN Dyspepsia  bisacodyl 5 milliGRAM(s) Oral every 12 hours PRN Constipation  diazepam    Tablet 5 milliGRAM(s) Oral daily PRN for moderate to severe anxiety  ibuprofen  Tablet. 400 milliGRAM(s) Oral every 8 hours PRN Moderate Pain (4 - 6), Severe Pain (7 - 10)  melatonin 3 milliGRAM(s) Oral at bedtime PRN Insomnia  senna 2 Tablet(s) Oral at bedtime PRN Constipation      PHYSICAL EXAM:    I&O's Summary    ICU Vital Signs Last 24 Hrs  T(C): 36.7 (21 Sep 2023 04:15), Max: 37.3 (20 Sep 2023 22:29)  T(F): 98 (21 Sep 2023 04:15), Max: 99.2 (20 Sep 2023 22:29)  HR: 75 (21 Sep 2023 04:15) (71 - 75)  BP: 136/75 (21 Sep 2023 04:15) (136/75 - 157/69)  BP(mean): 78 (21 Sep 2023 04:15) (78 - 87)  ABP: --  ABP(mean): --  RR: 18 (21 Sep 2023 04:15) (18 - 18)  SpO2: 97% (21 Sep 2023 04:15) (97% - 100%)    O2 Parameters below as of 21 Sep 2023 04:15  Patient On (Oxygen Delivery Method): room air      PHYSICAL EXAM:  GENERAL: NAD  CHEST/LUNG: Clear to auscultation bilaterally; No wheeze  HEART: Regular rate and rhythm  ABDOMEN: Soft, Nontender, Nondistended  EXTREMITIES:  Rt knee medial side with minimal ecymosis resolved. Mild swelling of R knee when compared to L knee. Pt able to lift and extend R leg against gravity and flex right knee joint . No significant TTP  PSYCH: Calm  NEUROLOGY: AAOx3, b/l UE baseline tremors      LABS:  CAPILLARY BLOOD GLUCOSE                              11.2   9.42  )-----------( 266      ( 20 Sep 2023 05:30 )             35.1     09-20    141  |  104  |  31<H>  ----------------------------<  91  4.2   |  25  |  0.73    Ca    8.9      20 Sep 2023 05:30  Phos  3.0     09-20  Mg     2.00     09-20            Urinalysis Basic - ( 20 Sep 2023 05:30 )    Color: x / Appearance: x / SG: x / pH: x  Gluc: 91 mg/dL / Ketone: x  / Bili: x / Urobili: x   Blood: x / Protein: x / Nitrite: x   Leuk Esterase: x / RBC: x / WBC x   Sq Epi: x / Non Sq Epi: x / Bacteria: x        RADIOLOGY & ADDITIONAL TESTS:    Imaging Personally Reviewed:    Consultant(s) Notes Reviewed:      Care Discussed with Consultants/Other Providers:

## 2023-09-21 NOTE — PROGRESS NOTE ADULT - PROBLEM SELECTOR PLAN 5
DVT ppx: Heparin SubQ  Diet: Regular  Wound care for sacral IAD & MAD  Anemia- Normocytic. No signs of symptoms of active bleeding. Iron studies c/w AOCD.  Monitor     Dispo planning. PT rec rehab. Will f/u SW/CM daily who is working on arrangements.

## 2023-09-22 LAB
APPEARANCE UR: CLEAR — SIGNIFICANT CHANGE UP
BILIRUB UR-MCNC: NEGATIVE — SIGNIFICANT CHANGE UP
COLOR SPEC: YELLOW — SIGNIFICANT CHANGE UP
DIFF PNL FLD: NEGATIVE — SIGNIFICANT CHANGE UP
GLUCOSE UR QL: NEGATIVE MG/DL — SIGNIFICANT CHANGE UP
KETONES UR-MCNC: NEGATIVE MG/DL — SIGNIFICANT CHANGE UP
LEUKOCYTE ESTERASE UR-ACNC: NEGATIVE — SIGNIFICANT CHANGE UP
NITRITE UR-MCNC: NEGATIVE — SIGNIFICANT CHANGE UP
PH UR: 6.5 — SIGNIFICANT CHANGE UP (ref 5–8)
PROT UR-MCNC: NEGATIVE MG/DL — SIGNIFICANT CHANGE UP
SP GR SPEC: 1.02 — SIGNIFICANT CHANGE UP (ref 1–1.03)
UROBILINOGEN FLD QL: 1 MG/DL — SIGNIFICANT CHANGE UP (ref 0.2–1)

## 2023-09-22 PROCEDURE — 99232 SBSQ HOSP IP/OBS MODERATE 35: CPT

## 2023-09-22 RX ORDER — DIAZEPAM 5 MG
5 TABLET ORAL DAILY
Refills: 0 | Status: DISCONTINUED | OUTPATIENT
Start: 2023-09-22 | End: 2023-09-25

## 2023-09-22 RX ADMIN — TAMSULOSIN HYDROCHLORIDE 0.4 MILLIGRAM(S): 0.4 CAPSULE ORAL at 21:02

## 2023-09-22 RX ADMIN — Medication 81 MILLIGRAM(S): at 12:05

## 2023-09-22 RX ADMIN — HEPARIN SODIUM 5000 UNIT(S): 5000 INJECTION INTRAVENOUS; SUBCUTANEOUS at 21:02

## 2023-09-22 RX ADMIN — Medication 5000 UNIT(S): at 17:08

## 2023-09-22 RX ADMIN — HEPARIN SODIUM 5000 UNIT(S): 5000 INJECTION INTRAVENOUS; SUBCUTANEOUS at 05:57

## 2023-09-22 RX ADMIN — Medication 1 TABLET(S): at 12:05

## 2023-09-22 RX ADMIN — LOSARTAN POTASSIUM 100 MILLIGRAM(S): 100 TABLET, FILM COATED ORAL at 05:56

## 2023-09-22 RX ADMIN — HEPARIN SODIUM 5000 UNIT(S): 5000 INJECTION INTRAVENOUS; SUBCUTANEOUS at 12:08

## 2023-09-22 RX ADMIN — OLANZAPINE 10 MILLIGRAM(S): 15 TABLET, FILM COATED ORAL at 12:06

## 2023-09-22 RX ADMIN — SERTRALINE 250 MILLIGRAM(S): 25 TABLET, FILM COATED ORAL at 12:05

## 2023-09-22 RX ADMIN — NYSTATIN CREAM 1 APPLICATION(S): 100000 CREAM TOPICAL at 05:57

## 2023-09-22 RX ADMIN — PANTOPRAZOLE SODIUM 40 MILLIGRAM(S): 20 TABLET, DELAYED RELEASE ORAL at 05:57

## 2023-09-22 NOTE — PROGRESS NOTE ADULT - PROBLEM SELECTOR PLAN 2
Pt with dysuria (burning)and hematuria for the past few days, also with intertrigo around groin and testes area. No flank pain but does endorse weight loss for the past year. Hematuria likely due to acute UTI but also possibly malignancy given weight loss and smoking history.   UCx: negative   - S/p Ceftriaxone.   - nystatin powder  -Outpt f/u for hematuria. Especially since urine cx was negative Pt with dysuria (burning)and hematuria for the past few days, also with intertrigo around groin and testes area. No flank pain but does endorse weight loss for the past year. Hematuria likely due to acute UTI but also possibly malignancy given weight loss and smoking history.   UCx: negative   - S/p Ceftriaxone.   - nystatin powder  -Outpt f/u for hematuria. Especially since urine cx was negative    Of note staff ordered u/a today as pt was c/o of dysuria. He did not have thi son my evaluation. Pt afebrile and without fever.   -f/u u/a

## 2023-09-22 NOTE — PROGRESS NOTE ADULT - SUBJECTIVE AND OBJECTIVE BOX
Patient is a 73y old  Male who presents with a chief complaint of R knee pain, inability to ambulate (21 Sep 2023 09:11)      SUBJECTIVE / OVERNIGHT EVENTS:    Review of Systems:     MEDICATIONS  (STANDING):  aspirin enteric coated 81 milliGRAM(s) Oral daily  cholecalciferol 5000 Unit(s) Oral daily  heparin   Injectable 5000 Unit(s) SubCutaneous every 8 hours  lidocaine   4% Patch 1 Patch Transdermal every 24 hours  LORazepam   Injectable 1 milliGRAM(s) IV Push once  losartan 100 milliGRAM(s) Oral daily  multivitamin 1 Tablet(s) Oral daily  nystatin Powder 1 Application(s) Topical two times a day  OLANZapine 10 milliGRAM(s) Oral daily  pantoprazole    Tablet 40 milliGRAM(s) Oral before breakfast  sertraline 250 milliGRAM(s) Oral daily  tamsulosin 0.4 milliGRAM(s) Oral at bedtime    MEDICATIONS  (PRN):  acetaminophen     Tablet .. 650 milliGRAM(s) Oral every 6 hours PRN Temp greater or equal to 38C (100.4F), Mild Pain (1 - 3)  aluminum hydroxide/magnesium hydroxide/simethicone Suspension 30 milliLiter(s) Oral every 4 hours PRN Dyspepsia  bisacodyl 5 milliGRAM(s) Oral every 12 hours PRN Constipation  diazepam    Tablet 5 milliGRAM(s) Oral daily PRN for moderate to severe anxiety  ibuprofen  Tablet. 400 milliGRAM(s) Oral every 8 hours PRN Moderate Pain (4 - 6), Severe Pain (7 - 10)  melatonin 3 milliGRAM(s) Oral at bedtime PRN Insomnia  senna 2 Tablet(s) Oral at bedtime PRN Constipation      PHYSICAL EXAM:    I&O's Summary    ICU Vital Signs Last 24 Hrs  T(C): 36.6 (22 Sep 2023 04:15), Max: 37.2 (21 Sep 2023 20:42)  T(F): 97.9 (22 Sep 2023 04:15), Max: 99 (21 Sep 2023 20:42)  HR: 81 (22 Sep 2023 04:15) (67 - 81)  BP: 156/77 (22 Sep 2023 04:15) (148/72 - 156/77)  BP(mean): 94 (22 Sep 2023 04:15) (94 - 94)  ABP: --  ABP(mean): --  RR: 17 (22 Sep 2023 04:15) (17 - 17)  SpO2: 100% (22 Sep 2023 04:15) (99% - 100%)    O2 Parameters below as of 22 Sep 2023 04:15  Patient On (Oxygen Delivery Method): room air    PHYSICAL EXAM:  GENERAL: NAD  CHEST/LUNG: Clear to auscultation bilaterally; No wheeze  HEART: Regular rate and rhythm  ABDOMEN: Soft, Nontender, Nondistended  EXTREMITIES:  Rt knee medial side with minimal ecymosis resolved. Mild swelling of R knee when compared to L knee. Pt able to lift and extend R leg against gravity and flex right knee joint . No significant TTP  PSYCH: Calm  NEUROLOGY: AAOx3, b/l UE baseline tremors        LABS:  CAPILLARY BLOOD GLUCOSE                          RADIOLOGY & ADDITIONAL TESTS:    Imaging Personally Reviewed:    Consultant(s) Notes Reviewed:      Care Discussed with Consultants/Other Providers: Patient is a 73y old  Male who presents with a chief complaint of R knee pain, inability to ambulate (21 Sep 2023 09:11)      SUBJECTIVE / OVERNIGHT EVENTS: No CP, SOB, n/v/d. No urinary symptoms or abd pain. State still has some R knee pain    Review of Systems:     MEDICATIONS  (STANDING):  aspirin enteric coated 81 milliGRAM(s) Oral daily  cholecalciferol 5000 Unit(s) Oral daily  heparin   Injectable 5000 Unit(s) SubCutaneous every 8 hours  lidocaine   4% Patch 1 Patch Transdermal every 24 hours  LORazepam   Injectable 1 milliGRAM(s) IV Push once  losartan 100 milliGRAM(s) Oral daily  multivitamin 1 Tablet(s) Oral daily  nystatin Powder 1 Application(s) Topical two times a day  OLANZapine 10 milliGRAM(s) Oral daily  pantoprazole    Tablet 40 milliGRAM(s) Oral before breakfast  sertraline 250 milliGRAM(s) Oral daily  tamsulosin 0.4 milliGRAM(s) Oral at bedtime    MEDICATIONS  (PRN):  acetaminophen     Tablet .. 650 milliGRAM(s) Oral every 6 hours PRN Temp greater or equal to 38C (100.4F), Mild Pain (1 - 3)  aluminum hydroxide/magnesium hydroxide/simethicone Suspension 30 milliLiter(s) Oral every 4 hours PRN Dyspepsia  bisacodyl 5 milliGRAM(s) Oral every 12 hours PRN Constipation  diazepam    Tablet 5 milliGRAM(s) Oral daily PRN for moderate to severe anxiety  ibuprofen  Tablet. 400 milliGRAM(s) Oral every 8 hours PRN Moderate Pain (4 - 6), Severe Pain (7 - 10)  melatonin 3 milliGRAM(s) Oral at bedtime PRN Insomnia  senna 2 Tablet(s) Oral at bedtime PRN Constipation      PHYSICAL EXAM:    I&O's Summary    ICU Vital Signs Last 24 Hrs  T(C): 36.6 (22 Sep 2023 04:15), Max: 37.2 (21 Sep 2023 20:42)  T(F): 97.9 (22 Sep 2023 04:15), Max: 99 (21 Sep 2023 20:42)  HR: 81 (22 Sep 2023 04:15) (67 - 81)  BP: 156/77 (22 Sep 2023 04:15) (148/72 - 156/77)  BP(mean): 94 (22 Sep 2023 04:15) (94 - 94)  ABP: --  ABP(mean): --  RR: 17 (22 Sep 2023 04:15) (17 - 17)  SpO2: 100% (22 Sep 2023 04:15) (99% - 100%)    O2 Parameters below as of 22 Sep 2023 04:15  Patient On (Oxygen Delivery Method): room air    PHYSICAL EXAM:  GENERAL: NAD  CHEST/LUNG: Clear to auscultation bilaterally; No wheeze  HEART: Regular rate and rhythm  ABDOMEN: Soft, Nontender, Nondistended  EXTREMITIES:  Rt knee medial side with minimal ecymosis resolved. Mild swelling of R knee when compared to L knee. Pt able to lift and extend R leg against gravity and flex right knee joint . No significant TTP  PSYCH: Calm  NEUROLOGY: AAOx3, b/l UE baseline tremors        LABS:  CAPILLARY BLOOD GLUCOSE                          RADIOLOGY & ADDITIONAL TESTS:    Imaging Personally Reviewed:    Consultant(s) Notes Reviewed:      Care Discussed with Consultants/Other Providers:

## 2023-09-22 NOTE — DIETITIAN INITIAL EVALUATION ADULT - OBTAIN CURRENT WEIGHT
Curettage Text: The wound bed was treated with curettage after the biopsy was performed. X Size Of Lesion In Cm: 0 Electrodesiccation Text: The wound bed was treated with electrodesiccation after the biopsy was performed. yes Anesthesia Volume In Cc (Will Not Render If 0): 0.5 Post-Care Instructions: I reviewed with the patient in detail post-care instructions. Patient is to keep the biopsy site dry overnight, and then apply Vaseline twice daily until healed. Type Of Destruction Used: Curettage Billing Type: Third-Party Bill Wound Care: Vaseline Dressing: bandage Biopsy Type: H and E Bill [id number] For Specimen Handling/Conveyance To Laboratory?: no Notification Instructions: Patient will be notified of biopsy results. However, patient instructed to call the office if not contacted within 2 weeks. Detail Level: Detailed Hemostasis: Electrocautery and Aluminum Chloride Cryotherapy Text: The wound bed was treated with cryotherapy after the biopsy was performed. Silver Nitrate Text: The wound bed was treated with silver nitrate after the biopsy was performed. Anesthesia Type: 1% lidocaine with epinephrine Biopsy Method: Double edge Personna blades Consent: Written consent was obtained and risks were reviewed including but not limited to scarring, infection, bleeding, scabbing, incomplete removal, nerve damage and allergy to anesthesia. Electrodesiccation And Curettage Text: The wound bed was treated with electrodesiccation and curettage after the biopsy was performed.

## 2023-09-22 NOTE — DIETITIAN INITIAL EVALUATION ADULT - OTHER INFO
Pt 72 yo male with PMHx of schizoaffective disorder from [facility] for right knee pain - per chart review.     At time of visit, Pt awake, alert. Per Pt, his appetite not well for past few days; but PTA Pt was eating fine. Pt partially edentulous, but Pt denies having any chewing difficulty. No report of swallowing difficulty either. No report of vomiting or diarrhea @ this time. +Last BM (9/18) per flow sheet.     Per Pt, his height: ~68" & his body weight: ~155#; Pt with unintended weight loss of ~70# in 1-2 years. Of note, Pt's weights: 69.9 kg (9/20/23), 99.3 kg ([facility] - 12/12/18). Food preferences discussed; better food options discussed as well. Rec to add PO supplement: Ensure Plus HP - 2x daily to diet rx. RD answered concerns related to diet. RD remains available, Pt made aware.

## 2023-09-22 NOTE — DIETITIAN INITIAL EVALUATION ADULT - ADD RECOMMEND
1. Encourage & assist Pt with meals; Monitor PO diet tolerance;   2. Honor food preferences;   3. Continue Multivitamin, Vit D3 as ordered;   4. Monitor labs, hydration status;

## 2023-09-23 PROCEDURE — 99232 SBSQ HOSP IP/OBS MODERATE 35: CPT

## 2023-09-23 RX ORDER — CAPSAICIN 0.025 %
1 CREAM (GRAM) TOPICAL EVERY 12 HOURS
Refills: 0 | Status: DISCONTINUED | OUTPATIENT
Start: 2023-09-23 | End: 2023-09-25

## 2023-09-23 RX ADMIN — LOSARTAN POTASSIUM 100 MILLIGRAM(S): 100 TABLET, FILM COATED ORAL at 06:30

## 2023-09-23 RX ADMIN — PANTOPRAZOLE SODIUM 40 MILLIGRAM(S): 20 TABLET, DELAYED RELEASE ORAL at 06:30

## 2023-09-23 RX ADMIN — SERTRALINE 250 MILLIGRAM(S): 25 TABLET, FILM COATED ORAL at 12:12

## 2023-09-23 RX ADMIN — Medication 3 MILLIGRAM(S): at 00:38

## 2023-09-23 RX ADMIN — HEPARIN SODIUM 5000 UNIT(S): 5000 INJECTION INTRAVENOUS; SUBCUTANEOUS at 06:30

## 2023-09-23 RX ADMIN — TAMSULOSIN HYDROCHLORIDE 0.4 MILLIGRAM(S): 0.4 CAPSULE ORAL at 21:34

## 2023-09-23 RX ADMIN — Medication 5000 UNIT(S): at 13:53

## 2023-09-23 RX ADMIN — HEPARIN SODIUM 5000 UNIT(S): 5000 INJECTION INTRAVENOUS; SUBCUTANEOUS at 21:34

## 2023-09-23 RX ADMIN — NYSTATIN CREAM 1 APPLICATION(S): 100000 CREAM TOPICAL at 18:14

## 2023-09-23 RX ADMIN — NYSTATIN CREAM 1 APPLICATION(S): 100000 CREAM TOPICAL at 06:30

## 2023-09-23 RX ADMIN — Medication 5 MILLIGRAM(S): at 00:38

## 2023-09-23 RX ADMIN — Medication 1 TABLET(S): at 12:12

## 2023-09-23 RX ADMIN — Medication 5 MILLIGRAM(S): at 12:14

## 2023-09-23 RX ADMIN — LIDOCAINE 1 PATCH: 4 CREAM TOPICAL at 12:13

## 2023-09-23 RX ADMIN — LIDOCAINE 1 PATCH: 4 CREAM TOPICAL at 18:14

## 2023-09-23 RX ADMIN — OLANZAPINE 10 MILLIGRAM(S): 15 TABLET, FILM COATED ORAL at 12:13

## 2023-09-23 RX ADMIN — Medication 1 APPLICATION(S): at 23:44

## 2023-09-23 RX ADMIN — Medication 81 MILLIGRAM(S): at 12:13

## 2023-09-23 RX ADMIN — HEPARIN SODIUM 5000 UNIT(S): 5000 INJECTION INTRAVENOUS; SUBCUTANEOUS at 14:50

## 2023-09-23 NOTE — PROGRESS NOTE ADULT - PROBLEM SELECTOR PLAN 2
Pt with dysuria (burning)and hematuria for the past few days, also with intertrigo around groin and testes area. No flank pain but does endorse weight loss for the past year. Hematuria likely due to acute UTI but also possibly malignancy given weight loss and smoking history.   UCx: negative   - S/p Ceftriaxone.   - nystatin powder  -Outpt f/u for hematuria. Especially since urine cx was negative    Of note staff ordered u/a as pt was c/o of dysuria. Pt afebrile and without fever. no dysuria on my interview  -u/a neg

## 2023-09-23 NOTE — PROGRESS NOTE ADULT - SUBJECTIVE AND OBJECTIVE BOX
Patient is a 73y old  Male who presents with a chief complaint of Right knee pain     (22 Sep 2023 11:05)      SUBJECTIVE / OVERNIGHT EVENTS: Pt seen and examined at 11:25am, no overnight events, pt still has some rt knee pain but refusing MRI, no other complaints.    MEDICATIONS  (STANDING):  aspirin enteric coated 81 milliGRAM(s) Oral daily  cholecalciferol 5000 Unit(s) Oral daily  heparin   Injectable 5000 Unit(s) SubCutaneous every 8 hours  lidocaine   4% Patch 1 Patch Transdermal every 24 hours  LORazepam   Injectable 1 milliGRAM(s) IV Push once  losartan 100 milliGRAM(s) Oral daily  multivitamin 1 Tablet(s) Oral daily  nystatin Powder 1 Application(s) Topical two times a day  OLANZapine 10 milliGRAM(s) Oral daily  pantoprazole    Tablet 40 milliGRAM(s) Oral before breakfast  sertraline 250 milliGRAM(s) Oral daily  tamsulosin 0.4 milliGRAM(s) Oral at bedtime    MEDICATIONS  (PRN):  acetaminophen     Tablet .. 650 milliGRAM(s) Oral every 6 hours PRN Temp greater or equal to 38C (100.4F), Mild Pain (1 - 3)  aluminum hydroxide/magnesium hydroxide/simethicone Suspension 30 milliLiter(s) Oral every 4 hours PRN Dyspepsia  bisacodyl 5 milliGRAM(s) Oral every 12 hours PRN Constipation  diazepam    Tablet 5 milliGRAM(s) Oral daily PRN for moderate to severe anxiety  ibuprofen  Tablet. 400 milliGRAM(s) Oral every 8 hours PRN Moderate Pain (4 - 6), Severe Pain (7 - 10)  melatonin 3 milliGRAM(s) Oral at bedtime PRN Insomnia  senna 2 Tablet(s) Oral at bedtime PRN Constipation      Vital Signs Last 24 Hrs  T(C): 36.6 (23 Sep 2023 04:15), Max: 37.7 (22 Sep 2023 19:52)  T(F): 97.8 (23 Sep 2023 04:15), Max: 99.9 (22 Sep 2023 19:52)  HR: 72 (23 Sep 2023 04:15) (72 - 82)  BP: 134/62 (23 Sep 2023 04:15) (134/62 - 152/80)  BP(mean): 79 (23 Sep 2023 04:15) (79 - 79)  RR: 18 (23 Sep 2023 04:15) (18 - 18)  SpO2: 99% (23 Sep 2023 04:15) (97% - 99%)    Parameters below as of 23 Sep 2023 04:15  Patient On (Oxygen Delivery Method): room air      CAPILLARY BLOOD GLUCOSE        I&O's Summary    22 Sep 2023 07:01  -  23 Sep 2023 07:00  --------------------------------------------------------  IN: 256 mL / OUT: 400 mL / NET: -144 mL        PHYSICAL EXAM:  GENERAL: NAD  CHEST/LUNG: Clear to auscultation bilaterally; No wheeze  HEART: Regular rate and rhythm  ABDOMEN: Soft, Nontender, Nondistended  EXTREMITIES: Mild swelling of R knee when compared to L knee. Pt able to lift and extend R leg against gravity and flex right knee joint . No significant TTP  PSYCH: Calm  NEUROLOGY: AAOx3, b/l UE baseline tremors    LABS:                Urinalysis Basic - ( 22 Sep 2023 14:20 )    Color: Yellow / Appearance: Clear / S.025 / pH: x  Gluc: x / Ketone: Negative mg/dL  / Bili: Negative / Urobili: 1.0 mg/dL   Blood: x / Protein: Negative mg/dL / Nitrite: Negative   Leuk Esterase: Negative / RBC: x / WBC x   Sq Epi: x / Non Sq Epi: x / Bacteria: x        RADIOLOGY & ADDITIONAL TESTS:    Imaging Personally Reviewed:    Consultant(s) Notes Reviewed:      Care Discussed with Consultants/Other Providers:

## 2023-09-24 PROCEDURE — 99232 SBSQ HOSP IP/OBS MODERATE 35: CPT

## 2023-09-24 RX ADMIN — SERTRALINE 250 MILLIGRAM(S): 25 TABLET, FILM COATED ORAL at 14:30

## 2023-09-24 RX ADMIN — LOSARTAN POTASSIUM 100 MILLIGRAM(S): 100 TABLET, FILM COATED ORAL at 06:37

## 2023-09-24 RX ADMIN — TAMSULOSIN HYDROCHLORIDE 0.4 MILLIGRAM(S): 0.4 CAPSULE ORAL at 21:18

## 2023-09-24 RX ADMIN — Medication 81 MILLIGRAM(S): at 14:30

## 2023-09-24 RX ADMIN — NYSTATIN CREAM 1 APPLICATION(S): 100000 CREAM TOPICAL at 06:38

## 2023-09-24 RX ADMIN — HEPARIN SODIUM 5000 UNIT(S): 5000 INJECTION INTRAVENOUS; SUBCUTANEOUS at 06:39

## 2023-09-24 RX ADMIN — OLANZAPINE 10 MILLIGRAM(S): 15 TABLET, FILM COATED ORAL at 14:30

## 2023-09-24 RX ADMIN — HEPARIN SODIUM 5000 UNIT(S): 5000 INJECTION INTRAVENOUS; SUBCUTANEOUS at 21:17

## 2023-09-24 RX ADMIN — PANTOPRAZOLE SODIUM 40 MILLIGRAM(S): 20 TABLET, DELAYED RELEASE ORAL at 06:38

## 2023-09-24 RX ADMIN — NYSTATIN CREAM 1 APPLICATION(S): 100000 CREAM TOPICAL at 17:05

## 2023-09-24 RX ADMIN — HEPARIN SODIUM 5000 UNIT(S): 5000 INJECTION INTRAVENOUS; SUBCUTANEOUS at 14:30

## 2023-09-24 RX ADMIN — Medication 1 TABLET(S): at 14:30

## 2023-09-24 RX ADMIN — Medication 5000 UNIT(S): at 14:30

## 2023-09-24 NOTE — PROGRESS NOTE ADULT - PROBLEM SELECTOR PLAN 5
DVT ppx: Heparin SubQ  Diet: Regular  Wound care for sacral IAD & MAD  Anemia- Normocytic. No signs of symptoms of active bleeding. Iron studies c/w AOCD.  Monitor     Dispo planning. PT rec rehab. Will f/u SW/CM daily who is working on arrangements. DVT ppx: Heparin SubQ  Diet: Regular  Wound care for sacral IAD & MAD  Anemia- Normocytic. No signs of symptoms of active bleeding. Iron studies c/w AOCD.  Monitor     Dispo planning. PT rec rehab. Will f/u SW/CM daily who is working on arrangements.    f/u today's labs when done  Plan discussed with ACP

## 2023-09-24 NOTE — PROGRESS NOTE ADULT - SUBJECTIVE AND OBJECTIVE BOX
Patient is a 73y old  Male who presents with a chief complaint of R knee pain, inability to ambulate (23 Sep 2023 13:50)      SUBJECTIVE / OVERNIGHT EVENTS: Pt seen and examined at 11:05am, no overnight events, pt still has some rt knee pain otherwise no other new issues.       MEDICATIONS  (STANDING):  aspirin enteric coated 81 milliGRAM(s) Oral daily  cholecalciferol 5000 Unit(s) Oral daily  heparin   Injectable 5000 Unit(s) SubCutaneous every 8 hours  lidocaine   4% Patch 1 Patch Transdermal every 24 hours  LORazepam   Injectable 1 milliGRAM(s) IV Push once  losartan 100 milliGRAM(s) Oral daily  multivitamin 1 Tablet(s) Oral daily  nystatin Powder 1 Application(s) Topical two times a day  OLANZapine 10 milliGRAM(s) Oral daily  pantoprazole    Tablet 40 milliGRAM(s) Oral before breakfast  sertraline 250 milliGRAM(s) Oral daily  tamsulosin 0.4 milliGRAM(s) Oral at bedtime    MEDICATIONS  (PRN):  acetaminophen     Tablet .. 650 milliGRAM(s) Oral every 6 hours PRN Temp greater or equal to 38C (100.4F), Mild Pain (1 - 3)  aluminum hydroxide/magnesium hydroxide/simethicone Suspension 30 milliLiter(s) Oral every 4 hours PRN Dyspepsia  bisacodyl 5 milliGRAM(s) Oral every 12 hours PRN Constipation  capsaicin HP 0.075% Cream 1 Application(s) Topical every 12 hours PRN pain  diazepam    Tablet 5 milliGRAM(s) Oral daily PRN for moderate to severe anxiety  ibuprofen  Tablet. 400 milliGRAM(s) Oral every 8 hours PRN Moderate Pain (4 - 6), Severe Pain (7 - 10)  melatonin 3 milliGRAM(s) Oral at bedtime PRN Insomnia  senna 2 Tablet(s) Oral at bedtime PRN Constipation      Vital Signs Last 24 Hrs  T(C): 37 (24 Sep 2023 11:22), Max: 37 (24 Sep 2023 11:22)  T(F): 98.6 (24 Sep 2023 11:22), Max: 98.6 (24 Sep 2023 11:22)  HR: 82 (24 Sep 2023 11:22) (64 - 82)  BP: 120/61 (24 Sep 2023 11:22) (120/61 - 144/65)  BP(mean): 86 (24 Sep 2023 05:32) (86 - 86)  RR: 17 (24 Sep 2023 11:22) (17 - 18)  SpO2: 100% (24 Sep 2023 11:22) (100% - 100%)    Parameters below as of 24 Sep 2023 11:22  Patient On (Oxygen Delivery Method): room air      CAPILLARY BLOOD GLUCOSE        I&O's Summary      PHYSICAL EXAM:  GENERAL: NAD  CHEST/LUNG: Clear to auscultation bilaterally; No wheeze  HEART: Regular rate and rhythm  ABDOMEN: Soft, Nontender, Nondistended  EXTREMITIES: Mild swelling of R knee when compared to L knee. Pt able to lift and extend R leg against gravity and flex right knee joint . No significant TTP  PSYCH: Calm  NEUROLOGY: AAOx3, b/l UE baseline tremors    LABS:                Urinalysis Basic - ( 22 Sep 2023 14:20 )    Color: Yellow / Appearance: Clear / S.025 / pH: x  Gluc: x / Ketone: Negative mg/dL  / Bili: Negative / Urobili: 1.0 mg/dL   Blood: x / Protein: Negative mg/dL / Nitrite: Negative   Leuk Esterase: Negative / RBC: x / WBC x   Sq Epi: x / Non Sq Epi: x / Bacteria: x        RADIOLOGY & ADDITIONAL TESTS:    Imaging Personally Reviewed:    Consultant(s) Notes Reviewed:      Care Discussed with Consultants/Other Providers:

## 2023-09-25 LAB
ANION GAP SERPL CALC-SCNC: 10 MMOL/L — SIGNIFICANT CHANGE UP (ref 7–14)
BUN SERPL-MCNC: 32 MG/DL — HIGH (ref 7–23)
CALCIUM SERPL-MCNC: 9 MG/DL — SIGNIFICANT CHANGE UP (ref 8.4–10.5)
CHLORIDE SERPL-SCNC: 105 MMOL/L — SIGNIFICANT CHANGE UP (ref 98–107)
CO2 SERPL-SCNC: 25 MMOL/L — SIGNIFICANT CHANGE UP (ref 22–31)
CREAT SERPL-MCNC: 0.69 MG/DL — SIGNIFICANT CHANGE UP (ref 0.5–1.3)
EGFR: 97 ML/MIN/1.73M2 — SIGNIFICANT CHANGE UP
GLUCOSE SERPL-MCNC: 97 MG/DL — SIGNIFICANT CHANGE UP (ref 70–99)
HCT VFR BLD CALC: 36.1 % — LOW (ref 39–50)
HGB BLD-MCNC: 11.8 G/DL — LOW (ref 13–17)
MAGNESIUM SERPL-MCNC: 1.9 MG/DL — SIGNIFICANT CHANGE UP (ref 1.6–2.6)
MCHC RBC-ENTMCNC: 29.6 PG — SIGNIFICANT CHANGE UP (ref 27–34)
MCHC RBC-ENTMCNC: 32.7 GM/DL — SIGNIFICANT CHANGE UP (ref 32–36)
MCV RBC AUTO: 90.5 FL — SIGNIFICANT CHANGE UP (ref 80–100)
NRBC # BLD: 0 /100 WBCS — SIGNIFICANT CHANGE UP (ref 0–0)
NRBC # FLD: 0 K/UL — SIGNIFICANT CHANGE UP (ref 0–0)
PHOSPHATE SERPL-MCNC: 3 MG/DL — SIGNIFICANT CHANGE UP (ref 2.5–4.5)
PLATELET # BLD AUTO: 303 K/UL — SIGNIFICANT CHANGE UP (ref 150–400)
POTASSIUM SERPL-MCNC: 4.1 MMOL/L — SIGNIFICANT CHANGE UP (ref 3.5–5.3)
POTASSIUM SERPL-SCNC: 4.1 MMOL/L — SIGNIFICANT CHANGE UP (ref 3.5–5.3)
RBC # BLD: 3.99 M/UL — LOW (ref 4.2–5.8)
RBC # FLD: 13.3 % — SIGNIFICANT CHANGE UP (ref 10.3–14.5)
SODIUM SERPL-SCNC: 140 MMOL/L — SIGNIFICANT CHANGE UP (ref 135–145)
WBC # BLD: 10.58 K/UL — HIGH (ref 3.8–10.5)
WBC # FLD AUTO: 10.58 K/UL — HIGH (ref 3.8–10.5)

## 2023-09-25 PROCEDURE — 99239 HOSP IP/OBS DSCHRG MGMT >30: CPT

## 2023-09-25 RX ORDER — ACETAMINOPHEN 500 MG
2 TABLET ORAL
Qty: 0 | Refills: 0 | DISCHARGE
Start: 2023-09-25

## 2023-09-25 RX ORDER — CAPSAICIN 0.025 %
1 CREAM (GRAM) TOPICAL
Qty: 0 | Refills: 0 | DISCHARGE
Start: 2023-09-25

## 2023-09-25 RX ORDER — IBUPROFEN 200 MG
1 TABLET ORAL
Qty: 0 | Refills: 0 | DISCHARGE
Start: 2023-09-25

## 2023-09-25 RX ORDER — HALOPERIDOL DECANOATE 100 MG/ML
100 INJECTION INTRAMUSCULAR ONCE
Refills: 0 | Status: COMPLETED | OUTPATIENT
Start: 2023-09-25 | End: 2023-09-25

## 2023-09-25 RX ORDER — HALOPERIDOL DECANOATE 100 MG/ML
100 INJECTION INTRAMUSCULAR
Qty: 0 | Refills: 0 | DISCHARGE

## 2023-09-25 RX ORDER — ACETAMINOPHEN 500 MG
2 TABLET ORAL
Refills: 0 | DISCHARGE

## 2023-09-25 RX ORDER — LIDOCAINE 4 G/100G
1 CREAM TOPICAL
Qty: 0 | Refills: 0 | DISCHARGE
Start: 2023-09-25

## 2023-09-25 RX ADMIN — HALOPERIDOL DECANOATE 100 MILLIGRAM(S): 100 INJECTION INTRAMUSCULAR at 12:39

## 2023-09-25 RX ADMIN — OLANZAPINE 10 MILLIGRAM(S): 15 TABLET, FILM COATED ORAL at 13:02

## 2023-09-25 RX ADMIN — NYSTATIN CREAM 1 APPLICATION(S): 100000 CREAM TOPICAL at 05:43

## 2023-09-25 RX ADMIN — SERTRALINE 250 MILLIGRAM(S): 25 TABLET, FILM COATED ORAL at 13:02

## 2023-09-25 RX ADMIN — PANTOPRAZOLE SODIUM 40 MILLIGRAM(S): 20 TABLET, DELAYED RELEASE ORAL at 06:08

## 2023-09-25 RX ADMIN — HEPARIN SODIUM 5000 UNIT(S): 5000 INJECTION INTRAVENOUS; SUBCUTANEOUS at 05:42

## 2023-09-25 RX ADMIN — LOSARTAN POTASSIUM 100 MILLIGRAM(S): 100 TABLET, FILM COATED ORAL at 05:56

## 2023-09-25 RX ADMIN — HEPARIN SODIUM 5000 UNIT(S): 5000 INJECTION INTRAVENOUS; SUBCUTANEOUS at 13:01

## 2023-09-25 RX ADMIN — Medication 81 MILLIGRAM(S): at 13:01

## 2023-09-25 RX ADMIN — Medication 1 TABLET(S): at 13:02

## 2023-09-25 RX ADMIN — Medication 5000 UNIT(S): at 13:01

## 2023-09-25 NOTE — PROGRESS NOTE ADULT - NUTRITIONAL ASSESSMENT
This patient has been assessed with a concern for Malnutrition and has been determined to have a diagnosis/diagnoses of Moderate protein-calorie malnutrition.    This patient is being managed with:   Diet Regular-  Supplement Feeding Modality:  Oral  Ensure Plus High Protein Cans or Servings Per Day:  1       Frequency:  Two Times a day  Entered: Sep 22 2023  2:22PM  

## 2023-09-25 NOTE — DISCHARGE NOTE PROVIDER - DISCHARGE SERVICE FOR PATIENT
Results for orders placed or performed in visit on 07/18/23   AMB POC STREP GO A DIRECT, DNA PROBE   Result Value Ref Range    Valid Internal Control, POC yes     Strep pyogenes DNA, POC Negative Negative   AMB POC INFLUENZA A  AND B REAL-TIME RT-PCR   Result Value Ref Range    Valid Internal Control, POC yes     Influenza A Antigen, POC Negative Negative    Influenza B Antigen, POC Negative Negative   POCT COVID-19, SARS-COV-2, PCR   Result Value Ref Range    SARS-COV-2, POC Detected (A) Not Detected    Lot Number      QC Pass/Fail pass on the discharge service for the patient. I have reviewed and made amendments to the documentation where necessary.

## 2023-09-25 NOTE — PROGRESS NOTE ADULT - PROBLEM SELECTOR PROBLEM 5
Diagnostic PSG completed per provider order.   
Encounter for medication review and counseling
Need for prophylactic measure
Need for prophylactic measure
Encounter for medication review and counseling
Need for prophylactic measure
Encounter for medication review and counseling
Need for prophylactic measure
Need for prophylactic measure

## 2023-09-25 NOTE — PROGRESS NOTE ADULT - PROBLEM SELECTOR PLAN 5
DVT ppx: Heparin SubQ  Diet: Regular  Wound care for sacral IAD & MAD  Anemia- Normocytic. No signs of symptoms of active bleeding. Iron studies c/w AOCD.  Monitor     Dispo planning. PT rec rehab. Will f/u SW/CM daily who is working on arrangements.    dc today, dc planning time spent in coordination 40mts ( preparing dc summary and plan)  Plan discussed with ACP

## 2023-09-25 NOTE — DISCHARGE NOTE NURSING/CASE MANAGEMENT/SOCIAL WORK - PATIENT PORTAL LINK FT
You can access the FollowMyHealth Patient Portal offered by [facility] by registering at the following website: [email or web address]. By joining [employer]’s FollowMyHealth portal, you will also be able to view your health information using other applications (apps) compatible with our system.

## 2023-09-25 NOTE — CHART NOTE - NSCHARTNOTEFT_GEN_A_CORE
As per last Psychiatry assessment, recommended for Zyprexa to 5mg qAM + 7.5mg qHS. Pt currently ordered for Zyprexa 10 mg QHS. Spoke with Psychiatry, given pt without any acute psychiatric complaints, will continue with current dose of Zyprexa 10 mg QHS.     ACP  [id number]
Emailed CT scan of right knee to patient's primary doctor at [facility] as directed by Dr. Major as multiple attempts to fax failed to transmit the message.
Notified by RN pt with tremors. On chart review pt with b/l UE tremors and RLE weakness. Pt appears anxious but cooperative with interview and exam. Pt states his leg hurts and he can't move it. Denies CP, SOB, abdominal or any other complaint. Pt A&Ox4. VSS. Rectal T 98 and . IV tylenol given for knee pain. If pt continues to appear uncomfortable/ anxious consider PRN valium for anxiety. Will make day team aware and cont to monitor.

## 2023-09-25 NOTE — PROGRESS NOTE ADULT - SUBJECTIVE AND OBJECTIVE BOX
Patient is a 73y old  Male who presents with a chief complaint of R knee pain, inability to ambulate (25 Sep 2023 10:07)      SUBJECTIVE / OVERNIGHT EVENTS: Pt seen and examined at 11:25am, no overnight events, pt denies any complaints, no other new issues.         MEDICATIONS  (STANDING):  aspirin enteric coated 81 milliGRAM(s) Oral daily  cholecalciferol 5000 Unit(s) Oral daily  heparin   Injectable 5000 Unit(s) SubCutaneous every 8 hours  lidocaine   4% Patch 1 Patch Transdermal every 24 hours  LORazepam   Injectable 1 milliGRAM(s) IV Push once  losartan 100 milliGRAM(s) Oral daily  multivitamin 1 Tablet(s) Oral daily  nystatin Powder 1 Application(s) Topical two times a day  OLANZapine 10 milliGRAM(s) Oral daily  pantoprazole    Tablet 40 milliGRAM(s) Oral before breakfast  sertraline 250 milliGRAM(s) Oral daily  tamsulosin 0.4 milliGRAM(s) Oral at bedtime    MEDICATIONS  (PRN):  acetaminophen     Tablet .. 650 milliGRAM(s) Oral every 6 hours PRN Temp greater or equal to 38C (100.4F), Mild Pain (1 - 3)  aluminum hydroxide/magnesium hydroxide/simethicone Suspension 30 milliLiter(s) Oral every 4 hours PRN Dyspepsia  bisacodyl 5 milliGRAM(s) Oral every 12 hours PRN Constipation  capsaicin HP 0.075% Cream 1 Application(s) Topical every 12 hours PRN pain  diazepam    Tablet 5 milliGRAM(s) Oral daily PRN for moderate to severe anxiety  ibuprofen  Tablet. 400 milliGRAM(s) Oral every 8 hours PRN Moderate Pain (4 - 6), Severe Pain (7 - 10)  melatonin 3 milliGRAM(s) Oral at bedtime PRN Insomnia  senna 2 Tablet(s) Oral at bedtime PRN Constipation      Vital Signs Last 24 Hrs  T(C): 36.1 (25 Sep 2023 11:55), Max: 36.5 (24 Sep 2023 22:10)  T(F): 97 (25 Sep 2023 11:55), Max: 97.7 (24 Sep 2023 22:10)  HR: 83 (25 Sep 2023 11:55) (77 - 87)  BP: 129/69 (25 Sep 2023 11:55) (129/69 - 134/71)  BP(mean): --  RR: 18 (25 Sep 2023 11:55) (18 - 18)  SpO2: 99% (25 Sep 2023 11:55) (95% - 99%)    Parameters below as of 25 Sep 2023 11:55  Patient On (Oxygen Delivery Method): room air      CAPILLARY BLOOD GLUCOSE        I&O's Summary    24 Sep 2023 07:01  -  25 Sep 2023 07:00  --------------------------------------------------------  IN: 680 mL / OUT: 650 mL / NET: 30 mL        PHYSICAL EXAM:  GENERAL: NAD  CHEST/LUNG: Clear to auscultation bilaterally; No wheeze  HEART: Regular rate and rhythm  ABDOMEN: Soft, Nontender, Nondistended  EXTREMITIES: Mild swelling of R knee when compared to L knee. Pt able to lift and extend R leg against gravity and flex right knee joint . No significant TTP  PSYCH: Calm  NEUROLOGY: AAOx3, b/l UE baseline tremors    LABS:                        11.8   10.58 )-----------( 303      ( 25 Sep 2023 07:00 )             36.1     09-25    140  |  105  |  32<H>  ----------------------------<  97  4.1   |  25  |  0.69    Ca    9.0      25 Sep 2023 07:00  Phos  3.0     09-25  Mg     1.90     09-25            Urinalysis Basic - ( 25 Sep 2023 07:00 )    Color: x / Appearance: x / SG: x / pH: x  Gluc: 97 mg/dL / Ketone: x  / Bili: x / Urobili: x   Blood: x / Protein: x / Nitrite: x   Leuk Esterase: x / RBC: x / WBC x   Sq Epi: x / Non Sq Epi: x / Bacteria: x        RADIOLOGY & ADDITIONAL TESTS:    Imaging Personally Reviewed:    Consultant(s) Notes Reviewed:      Care Discussed with Consultants/Other Providers:

## 2023-09-25 NOTE — PROGRESS NOTE ADULT - PROBLEM/PLAN-1
DISPLAY PLAN FREE TEXT
[Negative] : Heme/Lymph
DISPLAY PLAN FREE TEXT

## 2023-09-25 NOTE — DISCHARGE NOTE PROVIDER - NSDCMRMEDTOKEN_GEN_ALL_CORE_FT
acetaminophen 325 mg oral tablet: 2 tab(s) orally every 6 hours As needed Temp greater or equal to 38C (100.4F), Mild Pain (1 - 3)  aspirin 81 mg oral tablet: 1 tab(s) orally once a day  capsaicin 0.075% topical cream: 1 Apply topically to affected area every 12 hours As needed pain  cholecalciferol 125 mcg (5000 intl units) oral tablet: 1 tab(s) orally once a day  diazePAM 5 mg oral tablet: 1 tab(s) orally once a day as needed for  moderate to severe anxiety  docusate sodium 100 mg oral tablet: 2 tab(s) orally once a day as needed for  constipation  Haldol Decanoate 100 mg/mL intramuscular solution: 100 milligram(s) intramuscularly every 28 days last dose given 08/28/2023  ibuprofen 400 mg oral tablet: 1 tab(s) orally every 8 hours As needed Moderate Pain (4 - 6), Severe Pain (7 - 10)  ketoconazole 2% topical cream: Apply topically to affected area once a day as needed for  facial seborrhea  lidocaine 4% topical film: Apply topically to affected area once a day  losartan 100 mg oral tablet: 1 tab(s) orally once a day  Melatonin 3 mg oral tablet: 1 tab(s) orally once a day (at bedtime) as needed for  insomnia  Multiple Vitamins oral tablet: 1 tab(s) orally once a day  OLANZapine 10 mg oral tablet: 1 tab(s) orally once a day  pantoprazole 40 mg oral delayed release tablet: 1 tab(s) orally once a day  senna (sennosides) 17.2 mg oral tablet: 1 tab(s) orally once a day (at bedtime) as needed for  constipation  tamsulosin 0.4 mg oral capsule: 1 cap(s) orally once a day (at bedtime)  Zoloft 100 mg oral tablet: 2.5 tab(s) orally once a day   acetaminophen 325 mg oral tablet: 2 tab(s) orally every 6 hours As needed Temp greater or equal to 38C (100.4F), Mild Pain (1 - 3)  aspirin 81 mg oral tablet: 1 tab(s) orally once a day  capsaicin 0.075% topical cream: 1 Apply topically to affected area every 12 hours As needed pain  cholecalciferol 125 mcg (5000 intl units) oral tablet: 1 tab(s) orally once a day  diazePAM 5 mg oral tablet: 1 tab(s) orally once a day as needed for  moderate to severe anxiety  docusate sodium 100 mg oral tablet: 2 tab(s) orally once a day as needed for  constipation  Haldol Decanoate 100 mg/mL intramuscular solution: 100 milligram(s) intramuscularly every 28 days last dose given 09/25/23  ibuprofen 400 mg oral tablet: 1 tab(s) orally every 8 hours As needed Moderate Pain (4 - 6), Severe Pain (7 - 10)  ketoconazole 2% topical cream: Apply topically to affected area once a day as needed for  facial seborrhea  lidocaine 4% topical film: Apply topically to affected area once a day  losartan 100 mg oral tablet: 1 tab(s) orally once a day  Melatonin 3 mg oral tablet: 1 tab(s) orally once a day (at bedtime) as needed for  insomnia  Multiple Vitamins oral tablet: 1 tab(s) orally once a day  OLANZapine 10 mg oral tablet: 1 tab(s) orally once a day  pantoprazole 40 mg oral delayed release tablet: 1 tab(s) orally once a day  senna (sennosides) 17.2 mg oral tablet: 1 tab(s) orally once a day (at bedtime) as needed for  constipation  tamsulosin 0.4 mg oral capsule: 1 cap(s) orally once a day (at bedtime)  Zoloft 100 mg oral tablet: 2.5 tab(s) orally once a day

## 2023-09-25 NOTE — PROGRESS NOTE ADULT - PROBLEM SELECTOR PROBLEM 2
Urinary tract infection

## 2023-09-25 NOTE — DISCHARGE NOTE PROVIDER - NSDCCPCAREPLAN_GEN_ALL_CORE_FT
PRINCIPAL DISCHARGE DIAGNOSIS  Diagnosis: Right knee pain  Assessment and Plan of Treatment: CT and Xray imaging were unremarkable for any acute pathology. Continue with pain medications as needed. Continue with therapy as per your physical therapist to improve in gait and activities of daily living.      SECONDARY DISCHARGE DIAGNOSES  Diagnosis: Schizoaffective disorder  Assessment and Plan of Treatment: Continue your medications as directed and follow up with your PCP and psychiatrist for further evaluation and medical management.

## 2023-09-25 NOTE — PROGRESS NOTE ADULT - PROBLEM SELECTOR PROBLEM 3
Schizoaffective disorder

## 2023-09-25 NOTE — DISCHARGE NOTE PROVIDER - NSDCFUADDAPPT_GEN_ALL_CORE_FT
Sacrum to bilateral buttocks: Cleanse with NS, pat dry. Apply Liquid barrier film to periwound skin (allow to dry). Apply silicone foam with border, change daily and PRN if soiled.    Continue low air loss bed therapy, continue heel elevation, continue to turn & reposition per protocol, soft pillow between bony prominences, continue moisture management with barrier creams & single breathable pad, continue measures to decrease friction/shear/pressure. Continue with nutritional support as per dietary/orders.

## 2023-09-25 NOTE — PROGRESS NOTE ADULT - PROBLEM SELECTOR PLAN 3
Pt with history of schizoaffective disorder, on Haldol IM injections  - cont Olanzapine  - cont Sertraline  - cont Diazepam PRN
Pt with history of schizoaffective disorder, on Haldol IM injections  - cont Olanzapine  - cont Sertraline  - cont Diazepam PRN
Pt with history of schizoaffective disorder, on Haldol IM injections  - cont Olanzapine.   - cont Sertraline  - cont Diazepam PRN
Pt with history of schizoaffective disorder, on Haldol IM injections  - cont Olanzapine. Per psych recs will increase Zyprexa to 5mg qAM + 7.5mg qHS   - cont Sertraline  - cont Diazepam PRN
Pt with history of schizoaffective disorder, on Haldol IM injections  - cont Olanzapine.   - cont Sertraline  - cont Diazepam PRN
Pt with history of schizoaffective disorder, on Haldol IM injections  - cont Olanzapine. Per psych recs will increase Zyprexa to 5mg qAM + 7.5mg qHS   - cont Sertraline  - cont Diazepam PRN
Pt with history of schizoaffective disorder, on Haldol IM injections  - cont Olanzapine  - cont Sertraline  - cont Diazepam PRN
Pt with history of schizoaffective disorder, on Haldol IM injections  - cont Olanzapine.   - cont Sertraline  - cont Diazepam PRN
Pt with history of schizoaffective disorder, on Haldol IM injections  - cont Olanzapine  - cont Sertraline  - cont Diazepam PRN
Pt with history of schizoaffective disorder, on Haldol IM injections  - cont Olanzapine. Per psych recs will increase Zyprexa to 5mg qAM + 7.5mg qHS   - cont Sertraline  - cont Diazepam PRN
Pt with history of schizoaffective disorder, on Haldol IM injections  - cont Olanzapine. Per psych recs will increase Zyprexa to 5mg qAM + 7.5mg qHS   - cont Sertraline  - cont Diazepam PRN

## 2023-09-25 NOTE — PROGRESS NOTE ADULT - PROBLEM SELECTOR PLAN 4
- cont Losartan

## 2023-09-25 NOTE — PROGRESS NOTE ADULT - REASON FOR ADMISSION
R knee pain, inability to ambulate

## 2023-09-25 NOTE — DISCHARGE NOTE PROVIDER - HOSPITAL COURSE
73 year-old male with history of schizoaffective disorder, inpatient at [facility], sent to the ED for R knee pain and inability to ambulate, with CT and XR imaging without acute fractures or dislocations, admitted for further management . Now awaiting placement to rehab      Right knee pain.   Patient presenting with R knee pain and inability to walk s/p mechanical fall, denies LOC nor prodromal symptoms witnessed by staff at [facility], possibly due to meniscal tear  XR at [facility] (Tibia Fibula 2View, Knee, Hip 2Views) without acute fracture or dislocations, notes ORIF procedure transfixing proximal femoral fracture in R pelvis.   CTH and cervical spine at [facility] documentation without acute fractures or significant osseous abnormalities, diffuse osteopenia noted  CT Knee at [facility] without acute fractures or dislocations but suboptimal eval of meniscus and soft tissue  - PT : rehab  -MRI knee offered (even with sedation) but pt has been declining. Given mobility of knee has improved and MRI study not life threatening reason will not proceed w/ MRI at this time. This was d/w Dr. Laguerre from psychiatry   -pain control PRN.     Urinary tract infection.   Pt with dysuria (burning)and hematuria for the past few days, also with intertrigo around groin and testes area. No flank pain but does endorse weight loss for the past year. Hematuria likely due to acute UTI but also possibly malignancy given weight loss and smoking history.   UCx: negative   - S/p Ceftriaxone.   - nystatin powder  -Outpt f/u for hematuria. Especially since urine cx was negative    Of note staff ordered u/a as pt was c/o of dysuria. Pt afebrile and without fever. no dysuria on my interview  -u/a neg.     Schizoaffective disorder.    Pt with history of schizoaffective disorder, on Haldol IM injections  - cont Olanzapine.   - cont Sertraline  - cont Diazepam PRN.    : Hypertension.    - cont Losartan.      Anemia- Normocytic. No signs of symptoms of active bleeding. Iron studies c/w AOCD.  Monitored    Pt is accepted to rehab, advised to f/u as outpt for MRI

## 2023-09-25 NOTE — PROGRESS NOTE ADULT - ASSESSMENT
73 year-old male with history of schizoaffective disorder, inpatient at [facility], sent to the ED for R knee pain and inability to ambulate, with CT and XR imaging without acute fractures or dislocations, admitted for further management . Now awaiting placement to rehab     
73 year-old male with history of schizoaffective disorder, inpatient at [facility], sent to the ED for R knee pain and inability to ambulate, with CT and XR imaging without acute fractures or dislocations, admitted for further management . Now awaiting placement to rehab     
73 year-old male with history of schizoaffective disorder, inpatient at [facility], sent to the ED for R knee pain and inability to ambulate, with CT and XR imaging without acute fractures or dislocations, admitted for further management and PT assessment
73 year-old male with history of schizoaffective disorder, inpatient at [facility], sent to the ED for R knee pain and inability to ambulate, with CT and XR imaging without acute fractures or dislocations, admitted for further management and PT assessment.    Dispo: Rehab 
73 year-old male with history of schizoaffective disorder, inpatient at [facility], sent to the ED for R knee pain and inability to ambulate, with CT and XR imaging without acute fractures or dislocations, admitted for further management . Now awaiting placement to rehab     
73 year-old male with history of schizoaffective disorder, inpatient at [facility], sent to the ED for R knee pain and inability to ambulate, with CT and XR imaging without acute fractures or dislocations, admitted for further management . Now awaiting placement to rehab     
73 year-old male with history of schizoaffective disorder, inpatient at [facility], sent to the ED for R knee pain and inability to ambulate, with CT and XR imaging without acute fractures or dislocations, admitted for further management . Now awaiting placement to rehab     
73 year-old male with history of schizoaffective disorder, inpatient at [facility], sent to the ED for R knee pain and inability to ambulate, with CT and XR imaging without acute fractures or dislocations, admitted for further management and PT assessment.    
73 year-old male with history of schizoaffective disorder, inpatient at [facility], sent to the ED for R knee pain and inability to ambulate, with CT and XR imaging without acute fractures or dislocations, admitted for further management . Now awaiting placement to rehab     
73 year-old male with history of schizoaffective disorder, inpatient at [facility], sent to the ED for R knee pain and inability to ambulate, with CT and XR imaging without acute fractures or dislocations, admitted for further management and PT assessment
73 year-old male with history of schizoaffective disorder, inpatient at [facility], sent to the ED for R knee pain and inability to ambulate, with CT and XR imaging without acute fractures or dislocations, admitted for further management and PT assessment.

## 2023-09-25 NOTE — PROGRESS NOTE ADULT - PROBLEM SELECTOR PLAN 1
Patient presenting with R knee pain and inability to walk s/p mechanical fall, denies LOC nor prodromal symptoms [facility], [facility] due [facility]  XR at Health [facility] (Tibia Fibula 2View, Knee, Hip 2Views) without acute fracture or dislocations, notes ORIF procedure transfixing proximal femoral fracture in R pelvis.   CTH and cervical spine at [facility] documentation without acute fractures or significant osseous abnormalities, diffuse osteopenia noted  CT Knee at [facility] without acute fractures or dislocations but suboptimal eval of meniscus and soft tissue  - PT : rehab  -MRI knee offered (even with sedation) but pt has been declining. Given mobility of knee has improved and MRI study not life threatening reason will not proceed w/ MRI at this time. This was d/w Dr. Laguerre from psychiatry   -pain control PRN
Patient presenting with R knee pain and inability to walk s/p mechanical fall, denies LOC nor prodromal symptoms witnessed by staff at [facility], possibly due to meniscal tear  - XR at [facility] (Tibia Fibula 2View, Knee, Hip 2Views) without acute fracture or dislocations, notes ORIF procedure transfixing proximal femoral fracture in R pelvis.   - CTH and cervical spine at [facility] documentation without acute fractures or significant osseous abnormalities, diffuse osteopenia noted  - CT Knee at [facility] without acute fractures or dislocations but suboptimal eval of meniscus and soft tissue  - PT c/s to assess dispo
Patient presenting with R knee pain and inability to walk s/p mechanical fall, denies LOC nor prodromal symptoms [facility], possibly due to [facility]iscal tear  XR at [facility] (Tibia Fibula 2View, Knee, Hip 2Views) without acute fracture or dislocations, notes ORIF procedure transfixing proximal femoral fracture in R pelvis.   CTH and cervical spine at [facility] documentation without acute fractures or significant osseous abnormalities, diffuse osteopenia noted  CT Knee at [facility] without acute fractures or dislocations but suboptimal eval of meniscus and soft tissue  - PT : rehab  -MRI knee offered (even with sedation) but pt has been declining. Given mobility of knee has improved and MRI study not life threatening reason will not proceed w/ MRI at this time. This was d/w Dr. Laguerre from psychiatry   -pain control PRN
Patient presenting with R knee pain and inability to walk s/p mechanical fall, denies LOC nor prodromal symptoms witnessed by staff at [facility], possibly due to meniscal tear  - XR at [facility] (Tibia Fibula 2View, Knee, Hip 2Views) without acute fracture or dislocations, notes ORIF procedure transfixing proximal femoral fracture in R pelvis.   - CTH and cervical spine at [facility] documentation without acute fractures or significant osseous abnormalities, diffuse osteopenia noted  - CT Knee at [facility] without acute fractures or dislocations but suboptimal eval of meniscus and soft tissue  - PT : rehab
Patient presenting with R knee pain and inability to walk s/p mechanical fall, denies LOC nor prodromal [facility]  XR at [facility] (Tibia Fibula 2View, Knee, Hip 2Views) without acute fracture or dislocations, notes ORIF procedure transfixing proximal femoral fracture in R pelvis.   CTH and cervical spine at [facility] documentation without acute fractures or significant osseous abnormalities, diffuse osteopenia noted  CT Knee at [facility] without acute fractures or dislocations but suboptimal eval of meniscus and soft tissue  - PT : rehab  -MRI knee offered (even with sedation) but pt has been declining. Given mobility of knee has improved and MRI study not life threatening reason will not proceed w/ MRI at this time. This was d/w Dr. Laguerre from psychiatry   -pain control PRN
Patient presenting with R knee pain and inability to walk s/p mechanical fall, denies LOC nor prodromal [facility], [facility]  XR at [facility] (Tibia Fibula 2View, Knee, Hip 2Views) without acute fracture or dislocations, notes ORIF procedure transfixing proximal femoral fracture in R pelvis.   CTH and cervical spine at [facility] documentation without acute fractures or significant osseous abnormalities, diffuse osteopenia noted  CT Knee at [facility] without acute fractures or dislocations but suboptimal eval of meniscus and soft tissue  - PT : rehab  -MRI knee offered (even with sedation) but pt has been declining. Given mobility of knee has improved and MRI study not life threatening reason will not proceed w/ MRI at this time. This was d/w Dr. Laguerre from psychiatry   -pain control PRN
Patient presenting with R knee pain and inability to walk s/p mechanical fall, denies LOC [facility]romal [facility]  XR at [facility] (Tibia Fibula 2View, Knee, Hip 2Views) without acute fracture or dislocations, notes ORIF procedure transfixing proximal femoral fracture in R pelvis.   CTH and cervical spine at [facility] documentation without acute fractures or significant osseous abnormalities, diffuse osteopenia noted  CT Knee at [facility] without acute fractures or dislocations but suboptimal eval of meniscus and soft tissue  - PT : rehab  -MRI knee offered (even with sedation) but pt has been declining. Given mobility of knee has improved and MRI study not life threatening reason will not proceed w/ MRI at this time. This was d/w Dr. Laguerre from psychiatry   -pain control PRN
Patient presenting with R knee pain and inability to walk s/p mechanical fall, denies LOC nor prodromal symptoms witnessed by staff at [facility], possibly due to meniscal tear  XR at [facility] (Tibia Fibula 2View, Knee, Hip 2Views) without acute fracture or dislocations, notes ORIF procedure transfixing proximal femoral fracture in R pelvis.   CTH and cervical spine at [facility] documentation without acute fractures or significant osseous abnormalities, diffuse osteopenia noted  CT Knee at [facility] without acute fractures or dislocations but suboptimal eval of meniscus and soft tissue  - PT : rehab  -MRI knee offered but pt has been declining   -pain control PRN
Patient presenting with R knee pain and inability to walk s/p mechanical fall, denies LOC [facility] prodromal [facility]  XR at Health system (Tibia Fibula 2View, Knee, Hip 2Views) without acute fracture or dislocations, notes ORIF procedure transfixing proximal femoral fracture in R pelvis.   CTH and cervical spine at [facility] documentation without acute fractures or significant osseous abnormalities, diffuse osteopenia noted  CT Knee at [facility] without acute fractures or dislocations but suboptimal eval of meniscus and soft tissue  - PT : rehab  -MRI knee offered (even with sedation) but pt has been declining. Given mobility of knee has improved and MRI study not life threatening reason will not proceed w/ MRI at this time. This was d/w Dr. Laguerre from psychiatry   -pain control PRN
Patient presenting with R knee pain and inability to walk s/p mechanical fall, denies LOC [facility] prodromal [facility], [facility]  XR at [facility] (Tibia Fibula 2View, Knee, Hip 2Views) without acute fracture or dislocations, notes ORIF procedure transfixing proximal femoral fracture in R pelvis.   CTH and cervical spine at [facility] documentation without acute fractures or significant osseous abnormalities, diffuse osteopenia noted  CT Knee at [facility] without acute fractures or dislocations but suboptimal eval of meniscus and soft tissue  - PT : rehab  -MRI knee offered (even with sedation) but pt has been declining. Given mobility of knee has improved and MRI study not life threatening reason will not proceed w/ MRI at this time. This was d/w Dr. Laguerre from psychiatry   -pain control PRN
Patient presenting with R knee pain and inability to walk s/p mechanical fall, denies LOC nor prodromal symptoms witnessed by staff at [facility], possibly due to meniscal tear  - XR at [facility] (Tibia Fibula 2View, Knee, Hip 2Views) without acute fracture or dislocations, notes ORIF procedure transfixing proximal femoral fracture in R pelvis.   - CTH and cervical spine at [facility] documentation without acute fractures or significant osseous abnormalities, diffuse osteopenia noted  - CT Knee at [facility] without acute fractures or dislocations but suboptimal eval of meniscus and soft tissue  - PT : rehab

## 2023-09-25 NOTE — DISCHARGE NOTE PROVIDER - DETAILS OF MALNUTRITION DIAGNOSIS/DIAGNOSES
This patient has been assessed with a concern for Malnutrition and was treated during this hospitalization for the following Nutrition diagnosis/diagnoses:     -  09/22/2023: Moderate protein-calorie malnutrition

## 2023-09-26 VITALS
HEART RATE: 86 BPM | TEMPERATURE: 98 F | OXYGEN SATURATION: 100 % | DIASTOLIC BLOOD PRESSURE: 81 MMHG | RESPIRATION RATE: 18 BRPM | SYSTOLIC BLOOD PRESSURE: 123 MMHG

## 2024-11-05 NOTE — PROVIDER CONTACT NOTE (OTHER) - ASSESSMENT
Contacted [facility] [employer], [facility] to notify the pt was being discharged from a overturned 302 petition. Pt has a PFA against him by his ex-wife.  PSP stated they did not have to be notified but if pt would violate the PFA they would be contacted by the ex-wife.   pt became agitated and getting out of bed. Pt b/l UE and b/l LE tremoring + diaphoretic and tachy. VS; /78, hr 101, rectal temp 98.8. pt stated his knee and penis was hurting. pt able to state his name, , where he is and the year. b/l UE & LE no drift, no numbness, no tingling. pt tremors at baseline. and has B/L RLE weakness at baseline